# Patient Record
Sex: MALE | Race: OTHER | NOT HISPANIC OR LATINO
[De-identification: names, ages, dates, MRNs, and addresses within clinical notes are randomized per-mention and may not be internally consistent; named-entity substitution may affect disease eponyms.]

---

## 2017-01-31 ENCOUNTER — CHART COPY (OUTPATIENT)
Age: 67
End: 2017-01-31

## 2017-03-07 ENCOUNTER — CHART COPY (OUTPATIENT)
Age: 67
End: 2017-03-07

## 2017-04-18 ENCOUNTER — CHART COPY (OUTPATIENT)
Age: 67
End: 2017-04-18

## 2017-05-25 ENCOUNTER — CHART COPY (OUTPATIENT)
Age: 67
End: 2017-05-25

## 2017-06-05 ENCOUNTER — APPOINTMENT (OUTPATIENT)
Dept: ORTHOPEDIC SURGERY | Facility: CLINIC | Age: 67
End: 2017-06-05

## 2017-06-05 VITALS
HEART RATE: 75 BPM | WEIGHT: 215 LBS | BODY MASS INDEX: 35.82 KG/M2 | HEIGHT: 65 IN | SYSTOLIC BLOOD PRESSURE: 127 MMHG | DIASTOLIC BLOOD PRESSURE: 83 MMHG

## 2017-06-19 ENCOUNTER — OUTPATIENT (OUTPATIENT)
Dept: OUTPATIENT SERVICES | Facility: HOSPITAL | Age: 67
LOS: 1 days | Discharge: HOME | End: 2017-06-19

## 2017-06-19 DIAGNOSIS — Z96.651 PRESENCE OF RIGHT ARTIFICIAL KNEE JOINT: Chronic | ICD-10-CM

## 2017-06-19 DIAGNOSIS — Z53.8 PROCEDURE AND TREATMENT NOT CARRIED OUT FOR OTHER REASONS: Chronic | ICD-10-CM

## 2017-06-19 DIAGNOSIS — D47.2 MONOCLONAL GAMMOPATHY: ICD-10-CM

## 2017-06-22 ENCOUNTER — APPOINTMENT (OUTPATIENT)
Dept: HEMATOLOGY ONCOLOGY | Facility: CLINIC | Age: 67
End: 2017-06-22

## 2017-06-22 ENCOUNTER — RESULT REVIEW (OUTPATIENT)
Age: 67
End: 2017-06-22

## 2017-06-22 VITALS
BODY MASS INDEX: 37.02 KG/M2 | HEART RATE: 75 BPM | RESPIRATION RATE: 16 BRPM | OXYGEN SATURATION: 98 % | HEIGHT: 65 IN | TEMPERATURE: 98 F | SYSTOLIC BLOOD PRESSURE: 107 MMHG | WEIGHT: 222.23 LBS | DIASTOLIC BLOOD PRESSURE: 71 MMHG

## 2017-06-22 LAB
BASOPHILS # BLD AUTO: 0 K/UL — SIGNIFICANT CHANGE UP (ref 0–0.2)
BASOPHILS NFR BLD AUTO: 0.6 % — SIGNIFICANT CHANGE UP (ref 0–2)
EOSINOPHIL # BLD AUTO: 0.3 K/UL — SIGNIFICANT CHANGE UP (ref 0–0.5)
EOSINOPHIL NFR BLD AUTO: 5.5 % — SIGNIFICANT CHANGE UP (ref 0–6)
HCT VFR BLD CALC: 42 % — SIGNIFICANT CHANGE UP (ref 39–50)
HGB BLD-MCNC: 14.4 G/DL — SIGNIFICANT CHANGE UP (ref 13–17)
LYMPHOCYTES # BLD AUTO: 1.5 K/UL — SIGNIFICANT CHANGE UP (ref 1–3.3)
LYMPHOCYTES # BLD AUTO: 24 % — SIGNIFICANT CHANGE UP (ref 13–44)
MCHC RBC-ENTMCNC: 31.8 PG — SIGNIFICANT CHANGE UP (ref 27–34)
MCHC RBC-ENTMCNC: 34.4 G/DL — SIGNIFICANT CHANGE UP (ref 32–36)
MCV RBC AUTO: 92.5 FL — SIGNIFICANT CHANGE UP (ref 80–100)
MONOCYTES # BLD AUTO: 0.6 K/UL — SIGNIFICANT CHANGE UP (ref 0–0.9)
MONOCYTES NFR BLD AUTO: 9.1 % — SIGNIFICANT CHANGE UP (ref 2–14)
NEUTROPHILS # BLD AUTO: 3.8 K/UL — SIGNIFICANT CHANGE UP (ref 1.8–7.4)
NEUTROPHILS NFR BLD AUTO: 60.8 % — SIGNIFICANT CHANGE UP (ref 43–77)
PLATELET # BLD AUTO: 164 K/UL — SIGNIFICANT CHANGE UP (ref 150–400)
RBC # BLD: 4.54 M/UL — SIGNIFICANT CHANGE UP (ref 4.2–5.8)
RBC # FLD: 12 % — SIGNIFICANT CHANGE UP (ref 10.3–14.5)
WBC # BLD: 6.3 K/UL — SIGNIFICANT CHANGE UP (ref 3.8–10.5)
WBC # FLD AUTO: 6.3 K/UL — SIGNIFICANT CHANGE UP (ref 3.8–10.5)

## 2017-06-22 RX ORDER — LEVOCETIRIZINE DIHYDROCHLORIDE 5 MG/1
5 TABLET ORAL DAILY
Refills: 0 | Status: ACTIVE | COMMUNITY
Start: 2017-06-22

## 2017-06-28 LAB
ALBUMIN MFR SERPL ELPH: 60 %
ALBUMIN SERPL ELPH-MCNC: 4.4 G/DL
ALBUMIN SERPL-MCNC: 4.4 G/DL
ALBUMIN/GLOB SERPL: 1.5 RATIO
ALP BLD-CCNC: 73 U/L
ALPHA1 GLOB MFR SERPL ELPH: 4 %
ALPHA1 GLOB SERPL ELPH-MCNC: 0.3 G/DL
ALPHA2 GLOB MFR SERPL ELPH: 11.9 %
ALPHA2 GLOB SERPL ELPH-MCNC: 0.9 G/DL
ALT SERPL-CCNC: 21 U/L
ANION GAP SERPL CALC-SCNC: 13 MMOL/L
AST SERPL-CCNC: 19 U/L
B-GLOBULIN MFR SERPL ELPH: 9.8 %
B-GLOBULIN SERPL ELPH-MCNC: 0.7 G/DL
B2 MICROGLOB SERPL-MCNC: 1.6 MG/L
BILIRUB SERPL-MCNC: 0.6 MG/DL
BUN SERPL-MCNC: 21 MG/DL
CALCIUM SERPL-MCNC: 8.6 MG/DL
CHLORIDE SERPL-SCNC: 104 MMOL/L
CO2 SERPL-SCNC: 22 MMOL/L
CREAT SERPL-MCNC: 0.88 MG/DL
DEPRECATED KAPPA LC FREE/LAMBDA SER: 4.4 RATIO
GAMMA GLOB FLD ELPH-MCNC: 1 G/DL
GAMMA GLOB MFR SERPL ELPH: 14.3 %
GLUCOSE SERPL-MCNC: 119 MG/DL
IGA SER QL IEP: 64 MG/DL
IGG SER QL IEP: 1030 MG/DL
IGM SER QL IEP: 31 MG/DL
INTERPRETATION SERPL IEP-IMP: NORMAL
KAPPA LC CSF-MCNC: 1.48 MG/DL
KAPPA LC SERPL-MCNC: 6.51 MG/DL
LDH SERPL-CCNC: 208 U/L
M PROTEIN MFR SERPL ELPH: 8.3 %
M PROTEIN SPEC IFE-MCNC: NORMAL
MONOCLON BAND OBS SERPL: 0.6 G/DL
POTASSIUM SERPL-SCNC: 4.4 MMOL/L
PROT SERPL-MCNC: 7.3 G/DL
SODIUM SERPL-SCNC: 139 MMOL/L

## 2017-07-03 ENCOUNTER — CHART COPY (OUTPATIENT)
Age: 67
End: 2017-07-03

## 2017-07-07 ENCOUNTER — CHART COPY (OUTPATIENT)
Age: 67
End: 2017-07-07

## 2017-09-19 ENCOUNTER — CHART COPY (OUTPATIENT)
Age: 67
End: 2017-09-19

## 2017-09-20 ENCOUNTER — CHART COPY (OUTPATIENT)
Age: 67
End: 2017-09-20

## 2017-11-07 ENCOUNTER — CHART COPY (OUTPATIENT)
Age: 67
End: 2017-11-07

## 2017-12-04 ENCOUNTER — APPOINTMENT (OUTPATIENT)
Dept: ORTHOPEDIC SURGERY | Facility: CLINIC | Age: 67
End: 2017-12-04
Payer: MEDICARE

## 2017-12-04 VITALS
HEIGHT: 65 IN | SYSTOLIC BLOOD PRESSURE: 123 MMHG | BODY MASS INDEX: 36.99 KG/M2 | HEART RATE: 80 BPM | DIASTOLIC BLOOD PRESSURE: 75 MMHG | WEIGHT: 222 LBS

## 2017-12-04 PROCEDURE — 99213 OFFICE O/P EST LOW 20 MIN: CPT | Mod: 25

## 2017-12-04 PROCEDURE — 96372 THER/PROPH/DIAG INJ SC/IM: CPT

## 2017-12-13 ENCOUNTER — CHART COPY (OUTPATIENT)
Age: 67
End: 2017-12-13

## 2017-12-13 ENCOUNTER — APPOINTMENT (OUTPATIENT)
Dept: ORTHOPEDIC SURGERY | Facility: CLINIC | Age: 67
End: 2017-12-13
Payer: MEDICARE

## 2017-12-13 VITALS
HEIGHT: 65 IN | HEART RATE: 82 BPM | DIASTOLIC BLOOD PRESSURE: 79 MMHG | SYSTOLIC BLOOD PRESSURE: 126 MMHG | BODY MASS INDEX: 36.99 KG/M2 | WEIGHT: 222 LBS

## 2017-12-13 PROCEDURE — 99213 OFFICE O/P EST LOW 20 MIN: CPT

## 2017-12-15 ENCOUNTER — OUTPATIENT (OUTPATIENT)
Dept: OUTPATIENT SERVICES | Facility: HOSPITAL | Age: 67
LOS: 1 days | Discharge: HOME | End: 2017-12-15

## 2017-12-15 DIAGNOSIS — Z96.651 PRESENCE OF RIGHT ARTIFICIAL KNEE JOINT: Chronic | ICD-10-CM

## 2017-12-15 DIAGNOSIS — D47.2 MONOCLONAL GAMMOPATHY: ICD-10-CM

## 2017-12-15 DIAGNOSIS — Z53.8 PROCEDURE AND TREATMENT NOT CARRIED OUT FOR OTHER REASONS: Chronic | ICD-10-CM

## 2017-12-21 ENCOUNTER — RESULT REVIEW (OUTPATIENT)
Age: 67
End: 2017-12-21

## 2017-12-21 ENCOUNTER — APPOINTMENT (OUTPATIENT)
Dept: HEMATOLOGY ONCOLOGY | Facility: CLINIC | Age: 67
End: 2017-12-21
Payer: MEDICARE

## 2017-12-21 VITALS
TEMPERATURE: 97.9 F | SYSTOLIC BLOOD PRESSURE: 151 MMHG | OXYGEN SATURATION: 96 % | RESPIRATION RATE: 16 BRPM | HEART RATE: 69 BPM | WEIGHT: 221.34 LBS | DIASTOLIC BLOOD PRESSURE: 77 MMHG | BODY MASS INDEX: 36.83 KG/M2

## 2017-12-21 VITALS — SYSTOLIC BLOOD PRESSURE: 116 MMHG | DIASTOLIC BLOOD PRESSURE: 77 MMHG

## 2017-12-21 LAB
BASOPHILS # BLD AUTO: 0.1 K/UL — SIGNIFICANT CHANGE UP (ref 0–0.2)
BASOPHILS NFR BLD AUTO: 1 % — SIGNIFICANT CHANGE UP (ref 0–2)
EOSINOPHIL # BLD AUTO: 0.1 K/UL — SIGNIFICANT CHANGE UP (ref 0–0.5)
EOSINOPHIL NFR BLD AUTO: 2.5 % — SIGNIFICANT CHANGE UP (ref 0–6)
HCT VFR BLD CALC: 42 % — SIGNIFICANT CHANGE UP (ref 39–50)
HGB BLD-MCNC: 14.4 G/DL — SIGNIFICANT CHANGE UP (ref 13–17)
LYMPHOCYTES # BLD AUTO: 1.4 K/UL — SIGNIFICANT CHANGE UP (ref 1–3.3)
LYMPHOCYTES # BLD AUTO: 26.6 % — SIGNIFICANT CHANGE UP (ref 13–44)
MCHC RBC-ENTMCNC: 32 PG — SIGNIFICANT CHANGE UP (ref 27–34)
MCHC RBC-ENTMCNC: 34.3 G/DL — SIGNIFICANT CHANGE UP (ref 32–36)
MCV RBC AUTO: 93.1 FL — SIGNIFICANT CHANGE UP (ref 80–100)
MONOCYTES # BLD AUTO: 0.6 K/UL — SIGNIFICANT CHANGE UP (ref 0–0.9)
MONOCYTES NFR BLD AUTO: 10.8 % — SIGNIFICANT CHANGE UP (ref 2–14)
NEUTROPHILS # BLD AUTO: 3 K/UL — SIGNIFICANT CHANGE UP (ref 1.8–7.4)
NEUTROPHILS NFR BLD AUTO: 59.1 % — SIGNIFICANT CHANGE UP (ref 43–77)
PLATELET # BLD AUTO: 198 K/UL — SIGNIFICANT CHANGE UP (ref 150–400)
RBC # BLD: 4.51 M/UL — SIGNIFICANT CHANGE UP (ref 4.2–5.8)
RBC # FLD: 12.3 % — SIGNIFICANT CHANGE UP (ref 10.3–14.5)
WBC # BLD: 5.2 K/UL — SIGNIFICANT CHANGE UP (ref 3.8–10.5)
WBC # FLD AUTO: 5.2 K/UL — SIGNIFICANT CHANGE UP (ref 3.8–10.5)

## 2017-12-21 PROCEDURE — 99213 OFFICE O/P EST LOW 20 MIN: CPT

## 2018-01-17 ENCOUNTER — APPOINTMENT (OUTPATIENT)
Dept: ORTHOPEDIC SURGERY | Facility: CLINIC | Age: 68
End: 2018-01-17
Payer: MEDICARE

## 2018-01-17 VITALS
WEIGHT: 221 LBS | SYSTOLIC BLOOD PRESSURE: 125 MMHG | DIASTOLIC BLOOD PRESSURE: 85 MMHG | HEIGHT: 65 IN | HEART RATE: 66 BPM | BODY MASS INDEX: 36.82 KG/M2

## 2018-01-17 DIAGNOSIS — M17.11 UNILATERAL PRIMARY OSTEOARTHRITIS, RIGHT KNEE: ICD-10-CM

## 2018-01-17 DIAGNOSIS — M75.41 IMPINGEMENT SYNDROME OF RIGHT SHOULDER: ICD-10-CM

## 2018-01-17 PROCEDURE — 99213 OFFICE O/P EST LOW 20 MIN: CPT

## 2018-02-14 ENCOUNTER — APPOINTMENT (OUTPATIENT)
Dept: ORTHOPEDIC SURGERY | Facility: CLINIC | Age: 68
End: 2018-02-14
Payer: MEDICARE

## 2018-02-14 VITALS
HEIGHT: 65 IN | BODY MASS INDEX: 36.82 KG/M2 | WEIGHT: 221 LBS | DIASTOLIC BLOOD PRESSURE: 66 MMHG | HEART RATE: 85 BPM | SYSTOLIC BLOOD PRESSURE: 96 MMHG

## 2018-02-14 PROCEDURE — 99213 OFFICE O/P EST LOW 20 MIN: CPT

## 2018-03-19 ENCOUNTER — APPOINTMENT (OUTPATIENT)
Dept: ORTHOPEDIC SURGERY | Facility: CLINIC | Age: 68
End: 2018-03-19
Payer: MEDICARE

## 2018-03-19 VITALS
HEIGHT: 65 IN | BODY MASS INDEX: 36.82 KG/M2 | DIASTOLIC BLOOD PRESSURE: 66 MMHG | WEIGHT: 221 LBS | SYSTOLIC BLOOD PRESSURE: 101 MMHG | HEART RATE: 89 BPM

## 2018-03-19 PROCEDURE — 99213 OFFICE O/P EST LOW 20 MIN: CPT

## 2018-03-23 ENCOUNTER — CHART COPY (OUTPATIENT)
Age: 68
End: 2018-03-23

## 2018-05-07 ENCOUNTER — APPOINTMENT (OUTPATIENT)
Dept: ORTHOPEDIC SURGERY | Facility: CLINIC | Age: 68
End: 2018-05-07
Payer: MEDICARE

## 2018-05-07 VITALS
WEIGHT: 220 LBS | HEART RATE: 50 BPM | HEIGHT: 65 IN | DIASTOLIC BLOOD PRESSURE: 57 MMHG | SYSTOLIC BLOOD PRESSURE: 112 MMHG | BODY MASS INDEX: 36.65 KG/M2

## 2018-05-07 PROCEDURE — 99213 OFFICE O/P EST LOW 20 MIN: CPT

## 2018-06-26 ENCOUNTER — OUTPATIENT (OUTPATIENT)
Dept: OUTPATIENT SERVICES | Facility: HOSPITAL | Age: 68
LOS: 1 days | Discharge: HOME | End: 2018-06-26

## 2018-06-26 DIAGNOSIS — Z96.651 PRESENCE OF RIGHT ARTIFICIAL KNEE JOINT: Chronic | ICD-10-CM

## 2018-06-26 DIAGNOSIS — D47.2 MONOCLONAL GAMMOPATHY: ICD-10-CM

## 2018-06-26 DIAGNOSIS — Z53.8 PROCEDURE AND TREATMENT NOT CARRIED OUT FOR OTHER REASONS: Chronic | ICD-10-CM

## 2018-06-28 ENCOUNTER — APPOINTMENT (OUTPATIENT)
Dept: HEMATOLOGY ONCOLOGY | Facility: CLINIC | Age: 68
End: 2018-06-28
Payer: MEDICARE

## 2018-06-28 ENCOUNTER — RESULT REVIEW (OUTPATIENT)
Age: 68
End: 2018-06-28

## 2018-06-28 VITALS
DIASTOLIC BLOOD PRESSURE: 70 MMHG | WEIGHT: 218.24 LBS | OXYGEN SATURATION: 97 % | TEMPERATURE: 97.9 F | BODY MASS INDEX: 36.32 KG/M2 | SYSTOLIC BLOOD PRESSURE: 120 MMHG | RESPIRATION RATE: 17 BRPM | HEART RATE: 56 BPM

## 2018-06-28 LAB
ALBUMIN MFR SERPL ELPH: 62.3 %
ALBUMIN SERPL ELPH-MCNC: 4.3 G/DL
ALBUMIN SERPL-MCNC: 4.2 G/DL
ALBUMIN/GLOB SERPL: 1.6 RATIO
ALP BLD-CCNC: 71 U/L
ALPHA1 GLOB MFR SERPL ELPH: 4.1 %
ALPHA1 GLOB SERPL ELPH-MCNC: 0.3 G/DL
ALPHA2 GLOB MFR SERPL ELPH: 11.3 %
ALPHA2 GLOB SERPL ELPH-MCNC: 0.8 G/DL
ALT SERPL-CCNC: 25 U/L
ANION GAP SERPL CALC-SCNC: 12 MMOL/L
AST SERPL-CCNC: 21 U/L
B-GLOBULIN MFR SERPL ELPH: 9.2 %
B-GLOBULIN SERPL ELPH-MCNC: 0.6 G/DL
B2 MICROGLOB SERPL-MCNC: 1.7 MG/L
BASOPHILS # BLD AUTO: 0 K/UL — SIGNIFICANT CHANGE UP (ref 0–0.2)
BASOPHILS NFR BLD AUTO: 0.6 % — SIGNIFICANT CHANGE UP (ref 0–2)
BILIRUB SERPL-MCNC: 0.7 MG/DL
BUN SERPL-MCNC: 18 MG/DL
CALCIUM SERPL-MCNC: 9.2 MG/DL
CHLORIDE SERPL-SCNC: 102 MMOL/L
CO2 SERPL-SCNC: 27 MMOL/L
CREAT SERPL-MCNC: 1.03 MG/DL
DEPRECATED KAPPA LC FREE/LAMBDA SER: 5.63 RATIO
EOSINOPHIL # BLD AUTO: 0.3 K/UL — SIGNIFICANT CHANGE UP (ref 0–0.5)
EOSINOPHIL NFR BLD AUTO: 4.8 % — SIGNIFICANT CHANGE UP (ref 0–6)
GAMMA GLOB FLD ELPH-MCNC: 0.9 G/DL
GAMMA GLOB MFR SERPL ELPH: 13.1 %
GLUCOSE SERPL-MCNC: 87 MG/DL
HCT VFR BLD CALC: 44.3 % — SIGNIFICANT CHANGE UP (ref 39–50)
HGB BLD-MCNC: 15 G/DL — SIGNIFICANT CHANGE UP (ref 13–17)
IGA SER QL IEP: 79 MG/DL
IGG SER QL IEP: 1040 MG/DL
IGM SER QL IEP: 38 MG/DL
INTERPRETATION SERPL IEP-IMP: NORMAL
KAPPA LC CSF-MCNC: 0.86 MG/DL
KAPPA LC SERPL-MCNC: 4.84 MG/DL
LDH SERPL-CCNC: 196 U/L
LYMPHOCYTES # BLD AUTO: 1.6 K/UL — SIGNIFICANT CHANGE UP (ref 1–3.3)
LYMPHOCYTES # BLD AUTO: 25.3 % — SIGNIFICANT CHANGE UP (ref 13–44)
M PROTEIN MFR SERPL ELPH: 7.3 %
M PROTEIN SPEC IFE-MCNC: NORMAL
MCHC RBC-ENTMCNC: 30.7 PG — SIGNIFICANT CHANGE UP (ref 27–34)
MCHC RBC-ENTMCNC: 33.7 G/DL — SIGNIFICANT CHANGE UP (ref 32–36)
MCV RBC AUTO: 90.9 FL — SIGNIFICANT CHANGE UP (ref 80–100)
MONOCLON BAND OBS SERPL: 0.5 G/DL
MONOCYTES # BLD AUTO: 0.6 K/UL — SIGNIFICANT CHANGE UP (ref 0–0.9)
MONOCYTES NFR BLD AUTO: 10.1 % — SIGNIFICANT CHANGE UP (ref 2–14)
NEUTROPHILS # BLD AUTO: 3.7 K/UL — SIGNIFICANT CHANGE UP (ref 1.8–7.4)
NEUTROPHILS NFR BLD AUTO: 59.3 % — SIGNIFICANT CHANGE UP (ref 43–77)
PLATELET # BLD AUTO: 147 K/UL — LOW (ref 150–400)
POTASSIUM SERPL-SCNC: 4.2 MMOL/L
PROT SERPL-MCNC: 6.8 G/DL
RBC # BLD: 4.88 M/UL — SIGNIFICANT CHANGE UP (ref 4.2–5.8)
RBC # FLD: 12.6 % — SIGNIFICANT CHANGE UP (ref 10.3–14.5)
SODIUM SERPL-SCNC: 141 MMOL/L
WBC # BLD: 6.2 K/UL — SIGNIFICANT CHANGE UP (ref 3.8–10.5)
WBC # FLD AUTO: 6.2 K/UL — SIGNIFICANT CHANGE UP (ref 3.8–10.5)

## 2018-06-28 PROCEDURE — 99214 OFFICE O/P EST MOD 30 MIN: CPT

## 2018-07-13 ENCOUNTER — RX RENEWAL (OUTPATIENT)
Age: 68
End: 2018-07-13

## 2018-07-16 ENCOUNTER — APPOINTMENT (OUTPATIENT)
Dept: ORTHOPEDIC SURGERY | Facility: CLINIC | Age: 68
End: 2018-07-16
Payer: MEDICARE

## 2018-07-16 VITALS — WEIGHT: 218 LBS | HEIGHT: 65 IN | BODY MASS INDEX: 36.32 KG/M2

## 2018-07-16 DIAGNOSIS — Z98.890 OTHER SPECIFIED POSTPROCEDURAL STATES: ICD-10-CM

## 2018-07-16 DIAGNOSIS — Z86.79 PERSONAL HISTORY OF OTHER DISEASES OF THE CIRCULATORY SYSTEM: ICD-10-CM

## 2018-07-16 DIAGNOSIS — Z86.39 PERSONAL HISTORY OF OTHER ENDOCRINE, NUTRITIONAL AND METABOLIC DISEASE: ICD-10-CM

## 2018-07-16 PROCEDURE — 20550 NJX 1 TENDON SHEATH/LIGAMENT: CPT | Mod: LT

## 2018-07-16 PROCEDURE — 99214 OFFICE O/P EST MOD 30 MIN: CPT | Mod: 25

## 2018-08-15 ENCOUNTER — CHART COPY (OUTPATIENT)
Age: 68
End: 2018-08-15

## 2018-10-23 ENCOUNTER — CHART COPY (OUTPATIENT)
Age: 68
End: 2018-10-23

## 2018-11-07 ENCOUNTER — APPOINTMENT (OUTPATIENT)
Dept: ORTHOPEDIC SURGERY | Facility: CLINIC | Age: 68
End: 2018-11-07
Payer: MEDICARE

## 2018-11-07 VITALS
HEIGHT: 65 IN | SYSTOLIC BLOOD PRESSURE: 126 MMHG | DIASTOLIC BLOOD PRESSURE: 60 MMHG | HEART RATE: 48 BPM | BODY MASS INDEX: 36.32 KG/M2 | WEIGHT: 218 LBS

## 2018-11-07 PROCEDURE — 99214 OFFICE O/P EST MOD 30 MIN: CPT

## 2018-11-13 ENCOUNTER — APPOINTMENT (OUTPATIENT)
Dept: ORTHOPEDIC SURGERY | Facility: CLINIC | Age: 68
End: 2018-11-13
Payer: MEDICARE

## 2018-11-13 VITALS
HEART RATE: 78 BPM | WEIGHT: 218 LBS | SYSTOLIC BLOOD PRESSURE: 127 MMHG | HEIGHT: 65 IN | DIASTOLIC BLOOD PRESSURE: 64 MMHG | BODY MASS INDEX: 36.32 KG/M2

## 2018-11-13 DIAGNOSIS — M65.342 TRIGGER FINGER, LEFT RING FINGER: ICD-10-CM

## 2018-11-13 PROCEDURE — 99214 OFFICE O/P EST MOD 30 MIN: CPT | Mod: 25

## 2018-11-13 PROCEDURE — 20551 NJX 1 TENDON ORIGIN/INSJ: CPT | Mod: LT

## 2018-12-07 ENCOUNTER — OUTPATIENT (OUTPATIENT)
Dept: OUTPATIENT SERVICES | Facility: HOSPITAL | Age: 68
LOS: 1 days | Discharge: HOME | End: 2018-12-07

## 2018-12-07 DIAGNOSIS — Z53.8 PROCEDURE AND TREATMENT NOT CARRIED OUT FOR OTHER REASONS: Chronic | ICD-10-CM

## 2018-12-07 DIAGNOSIS — Z96.651 PRESENCE OF RIGHT ARTIFICIAL KNEE JOINT: Chronic | ICD-10-CM

## 2018-12-07 DIAGNOSIS — D47.2 MONOCLONAL GAMMOPATHY: ICD-10-CM

## 2018-12-13 ENCOUNTER — RESULT REVIEW (OUTPATIENT)
Age: 68
End: 2018-12-13

## 2018-12-13 ENCOUNTER — LABORATORY RESULT (OUTPATIENT)
Age: 68
End: 2018-12-13

## 2018-12-13 ENCOUNTER — APPOINTMENT (OUTPATIENT)
Dept: HEMATOLOGY ONCOLOGY | Facility: CLINIC | Age: 68
End: 2018-12-13
Payer: MEDICARE

## 2018-12-13 VITALS
HEART RATE: 73 BPM | DIASTOLIC BLOOD PRESSURE: 75 MMHG | BODY MASS INDEX: 36.43 KG/M2 | WEIGHT: 218.92 LBS | TEMPERATURE: 97.9 F | SYSTOLIC BLOOD PRESSURE: 137 MMHG | OXYGEN SATURATION: 97 % | RESPIRATION RATE: 16 BRPM

## 2018-12-13 LAB
BASOPHILS # BLD AUTO: 0.1 K/UL — SIGNIFICANT CHANGE UP (ref 0–0.2)
BASOPHILS NFR BLD AUTO: 1.4 % — SIGNIFICANT CHANGE UP (ref 0–2)
EOSINOPHIL # BLD AUTO: 0.2 K/UL — SIGNIFICANT CHANGE UP (ref 0–0.5)
EOSINOPHIL NFR BLD AUTO: 2.8 % — SIGNIFICANT CHANGE UP (ref 0–6)
HCT VFR BLD CALC: 43.1 % — SIGNIFICANT CHANGE UP (ref 39–50)
HGB BLD-MCNC: 14.3 G/DL — SIGNIFICANT CHANGE UP (ref 13–17)
LYMPHOCYTES # BLD AUTO: 1.5 K/UL — SIGNIFICANT CHANGE UP (ref 1–3.3)
LYMPHOCYTES # BLD AUTO: 26.6 % — SIGNIFICANT CHANGE UP (ref 13–44)
MCHC RBC-ENTMCNC: 30.6 PG — SIGNIFICANT CHANGE UP (ref 27–34)
MCHC RBC-ENTMCNC: 33.3 G/DL — SIGNIFICANT CHANGE UP (ref 32–36)
MCV RBC AUTO: 92.1 FL — SIGNIFICANT CHANGE UP (ref 80–100)
MONOCYTES # BLD AUTO: 0.7 K/UL — SIGNIFICANT CHANGE UP (ref 0–0.9)
MONOCYTES NFR BLD AUTO: 12.9 % — SIGNIFICANT CHANGE UP (ref 2–14)
NEUTROPHILS # BLD AUTO: 3.2 K/UL — SIGNIFICANT CHANGE UP (ref 1.8–7.4)
NEUTROPHILS NFR BLD AUTO: 56.3 % — SIGNIFICANT CHANGE UP (ref 43–77)
PLATELET # BLD AUTO: 147 K/UL — LOW (ref 150–400)
RBC # BLD: 4.67 M/UL — SIGNIFICANT CHANGE UP (ref 4.2–5.8)
RBC # FLD: 12.1 % — SIGNIFICANT CHANGE UP (ref 10.3–14.5)
WBC # BLD: 5.7 K/UL — SIGNIFICANT CHANGE UP (ref 3.8–10.5)
WBC # FLD AUTO: 5.7 K/UL — SIGNIFICANT CHANGE UP (ref 3.8–10.5)

## 2018-12-13 PROCEDURE — 99213 OFFICE O/P EST LOW 20 MIN: CPT

## 2018-12-14 NOTE — ASSESSMENT
[Palliative] : Goals of care discussed with patient: Palliative [Palliative Care Plan] : not applicable at this time [FreeTextEntry1] : MGUS - stable; he is at higher risk on account of  abnormal quantitative free serum light chain ratio.  His disease has remained stable since he was first seen here over 5  years ago.  No c/o suggestive of myeloma or NHL.\par repeat CMP, LDH, quant free serum light chains and ratio, SPEP, quant. immunoglobulins next  visit\par CBC wnl today except minimal decr in plt (147K)- no change in 6 months\par \par RV 6 months\par \par \par \par \par

## 2018-12-14 NOTE — PHYSICAL EXAM
[Fully active, able to carry on all pre-disease performance without restriction] : Status 0 - Fully active, able to carry on all pre-disease performance without restriction [Normal] : affect appropriate [de-identified] : no JVD, or calf tenderness, (+) venous stasis changes, no edema

## 2018-12-14 NOTE — HISTORY OF PRESENT ILLNESS
[Disease:__________________________] : Disease: [unfilled] [de-identified] : Adalberto Scanlon, was first seen at the [facility] [place] on September 4, 2012.  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  He has some degree of unsteadiness in his gait but no falls.  Of note, he has a history of type II diabetes.\par \par He underwent a neurologic evaluation by Dr. Beatrice Ta.  An MRI of the LS spine from October 2011 showed moderate left parasagittal and femoral disc herniations at L3-L4 which were new compared to a prior study in 2005.  Compression of the left L3 nerve root was seen as well as mild compression of the left lateral aspect of the thecal sac.  Additional smaller areas of herniation were seen at L1-L2 and L4-L5. 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  The MRI was repeated on June 27, 2012 and this showed no significant changes. \par \par Immunoprotein studies were performed to further complete the neuropathy evaluation and a small M spike of 0.5 g/dl was seen along with an IgG monoclonal kappa light chain protein.  The vitamin B12 level was normal.  A Lyme titer was negative. \par \par The patient was then seen by Dr. Lauri Gilbert and a bone marrow showed trilineage maturation along with an IgG kappa plasmacytosis averaging less than 10% of the overall marrow cellularity.  The plasma cells expressed , CD20 and BCL1.  Rare paratrabecular lymphoid aggregates were seen made up of small lymphoid cells which consisted of a mixture of B and T cells and were negative for CD5 and CD10.  \par \par A skeletal survey showed no lytic or blastic lesions.\par \par 24 hour urine showed no light chain excretion.\par \par The chemistries demonstrated a normal creatinine and calcium levels as well as liver function.  A beta-2 microglobulin was normal.  The free kappa serum light chain ratio was abnormal (5.08, 0.26-1.65) with a mild elevation of the free kappa serum light chain to 36.1 mg/L.\par \par He has since had no evidence of progression of disease and has not needed treatment to date.\par \par  [de-identified] : mgus, kappa light chain [FreeTextEntry1] : no therapy to date [de-identified] : MGUS - M spike stable, kappa light chain and quant free light chain ratio in 4 range.  Stable with about 5 yrs observation - minimal increase in kappa free light chains. CBC, creat were wnl as well as Ca in June, 2017.  labs reviewed and M spike 0.5 last visit versus 0.6 in December of 2017.   Beta 2 MG, CBC wnl.\par No bone pain, fevers, respiratory infections\par Peripheral neuropathy -now resolved\par Low Back Pain -intermittent, effort related,  pain sl better, less right shoulder pain- overall, no change x 6 mos\par Cervical disc pain - more pronounced now, sees chiropractor which has helped, overall no new skeletal c/o.  Neck pain improved post steroid/lidocaine local injection by Dr. Amaro\par   \par Type 2 diabetes - Hgb A1C  last was 6.6\par

## 2018-12-14 NOTE — REVIEW OF SYSTEMS
[Joint Pain] : joint pain [Negative] : Allergic/Immunologic [Joint Stiffness] : no joint stiffness [Muscle Pain] : no muscle pain [Muscle Weakness] : no muscle weakness [Skin Rash] : no skin rash [Confused] : no confusion [Dizziness] : no dizziness [Fainting] : no fainting [Difficulty Walking] : no difficulty walking [FreeTextEntry9] :  as above [de-identified] : peripheral neuropathy resolved

## 2019-01-30 ENCOUNTER — CHART COPY (OUTPATIENT)
Age: 69
End: 2019-01-30

## 2019-02-25 ENCOUNTER — CHART COPY (OUTPATIENT)
Age: 69
End: 2019-02-25

## 2019-03-26 ENCOUNTER — CHART COPY (OUTPATIENT)
Age: 69
End: 2019-03-26

## 2019-04-26 ENCOUNTER — CHART COPY (OUTPATIENT)
Age: 69
End: 2019-04-26

## 2019-05-15 ENCOUNTER — APPOINTMENT (OUTPATIENT)
Dept: ORTHOPEDIC SURGERY | Facility: CLINIC | Age: 69
End: 2019-05-15
Payer: MEDICARE

## 2019-05-15 VITALS — HEART RATE: 76 BPM | DIASTOLIC BLOOD PRESSURE: 76 MMHG | SYSTOLIC BLOOD PRESSURE: 117 MMHG

## 2019-05-15 PROCEDURE — 99213 OFFICE O/P EST LOW 20 MIN: CPT

## 2019-06-20 ENCOUNTER — OUTPATIENT (OUTPATIENT)
Dept: OUTPATIENT SERVICES | Facility: HOSPITAL | Age: 69
LOS: 1 days | Discharge: HOME | End: 2019-06-20

## 2019-06-20 DIAGNOSIS — Z53.8 PROCEDURE AND TREATMENT NOT CARRIED OUT FOR OTHER REASONS: Chronic | ICD-10-CM

## 2019-06-20 DIAGNOSIS — Z96.651 PRESENCE OF RIGHT ARTIFICIAL KNEE JOINT: Chronic | ICD-10-CM

## 2019-06-20 DIAGNOSIS — D47.2 MONOCLONAL GAMMOPATHY: ICD-10-CM

## 2019-06-25 ENCOUNTER — RESULT REVIEW (OUTPATIENT)
Age: 69
End: 2019-06-25

## 2019-06-25 ENCOUNTER — APPOINTMENT (OUTPATIENT)
Dept: HEMATOLOGY ONCOLOGY | Facility: CLINIC | Age: 69
End: 2019-06-25
Payer: MEDICARE

## 2019-06-25 VITALS
DIASTOLIC BLOOD PRESSURE: 62 MMHG | HEART RATE: 48 BPM | WEIGHT: 220.46 LBS | OXYGEN SATURATION: 97 % | RESPIRATION RATE: 15 BRPM | BODY MASS INDEX: 36.69 KG/M2 | TEMPERATURE: 98 F | SYSTOLIC BLOOD PRESSURE: 121 MMHG

## 2019-06-25 VITALS — HEART RATE: 52 BPM

## 2019-06-25 DIAGNOSIS — R26.9 UNSPECIFIED ABNORMALITIES OF GAIT AND MOBILITY: ICD-10-CM

## 2019-06-25 LAB
BASOPHILS # BLD AUTO: 0 K/UL — SIGNIFICANT CHANGE UP (ref 0–0.2)
BASOPHILS NFR BLD AUTO: 0.5 % — SIGNIFICANT CHANGE UP (ref 0–2)
EOSINOPHIL # BLD AUTO: 0.2 K/UL — SIGNIFICANT CHANGE UP (ref 0–0.5)
EOSINOPHIL NFR BLD AUTO: 3.2 % — SIGNIFICANT CHANGE UP (ref 0–6)
HCT VFR BLD CALC: 44.4 % — SIGNIFICANT CHANGE UP (ref 39–50)
HGB BLD-MCNC: 14.8 G/DL — SIGNIFICANT CHANGE UP (ref 13–17)
LYMPHOCYTES # BLD AUTO: 1.6 K/UL — SIGNIFICANT CHANGE UP (ref 1–3.3)
LYMPHOCYTES # BLD AUTO: 22.3 % — SIGNIFICANT CHANGE UP (ref 13–44)
MCHC RBC-ENTMCNC: 31.4 PG — SIGNIFICANT CHANGE UP (ref 27–34)
MCHC RBC-ENTMCNC: 33.4 G/DL — SIGNIFICANT CHANGE UP (ref 32–36)
MCV RBC AUTO: 93.9 FL — SIGNIFICANT CHANGE UP (ref 80–100)
MONOCYTES # BLD AUTO: 0.7 K/UL — SIGNIFICANT CHANGE UP (ref 0–0.9)
MONOCYTES NFR BLD AUTO: 10.4 % — SIGNIFICANT CHANGE UP (ref 2–14)
NEUTROPHILS # BLD AUTO: 4.6 K/UL — SIGNIFICANT CHANGE UP (ref 1.8–7.4)
NEUTROPHILS NFR BLD AUTO: 63.5 % — SIGNIFICANT CHANGE UP (ref 43–77)
PLATELET # BLD AUTO: 137 K/UL — LOW (ref 150–400)
RBC # BLD: 4.73 M/UL — SIGNIFICANT CHANGE UP (ref 4.2–5.8)
RBC # FLD: 13.2 % — SIGNIFICANT CHANGE UP (ref 10.3–14.5)
WBC # BLD: 7.2 K/UL — SIGNIFICANT CHANGE UP (ref 3.8–10.5)
WBC # FLD AUTO: 7.2 K/UL — SIGNIFICANT CHANGE UP (ref 3.8–10.5)

## 2019-06-25 PROCEDURE — 99213 OFFICE O/P EST LOW 20 MIN: CPT

## 2019-06-25 NOTE — ASSESSMENT
[Palliative] : Goals of care discussed with patient: Palliative [Palliative Care Plan] : not applicable at this time [FreeTextEntry1] : MGUS - stable for several years ; he remains at higher risk on account of  abnormal quantitative free serum light chain ratio.  \par His disease has remained stable since he was first seen here over 5  years ago.  No c/o suggestive of myeloma or NHL.\par repeat CMP, LDH, quant free serum light chains and ratio, SPEP, quant. immunoglobulins toay\par CBC wnl today except minimal decr in plt (137K)-  overall stable\par RV 6 months\par \par \par \par \par

## 2019-06-25 NOTE — ADDENDUM
[FreeTextEntry1] : Documented by Hayley Ortiz acting as a scribe for Dr. Germán Allen on 06/25/2019.\par \par All medical record entries made by a scribe were at my, Dr. Germán Allen direction and personally dictated by me on 06/25/2019. I have reviewed the chart and agree that the record accurately reflects my personal performance of the history, physical exam, procedure and imaging.\par

## 2019-06-25 NOTE — HISTORY OF PRESENT ILLNESS
[Disease:__________________________] : Disease: [unfilled] [de-identified] : Adalberto Scanlon, was first seen at the [facility] [place] on September 4, 2012.  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  He has some degree of unsteadiness in his gait but no falls.  Of note, he has a history of type II diabetes.\par \par He underwent a neurologic evaluation by Dr. Beatrice Ta.  An MRI of the LS spine from October 2011 showed moderate left parasagittal and femoral disc herniations at L3-L4 which were new compared to a prior study in 2005.  Compression of the left L3 nerve root was seen as well as mild compression of the left lateral aspect of the thecal sac.  Additional smaller areas of herniation were seen at L1-L2 and L4-L5. 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  The MRI was repeated on June 27, 2012 and this showed no significant changes. \par \par Immunoprotein studies were performed to further complete the neuropathy evaluation and a small M spike of 0.5 g/dl was seen along with an IgG monoclonal kappa light chain protein.  The vitamin B12 level was normal.  A Lyme titer was negative. \par \par The patient was then seen by Dr. Lauri Gilbert and a bone marrow showed trilineage maturation along with an IgG kappa plasmacytosis averaging less than 10% of the overall marrow cellularity.  The plasma cells expressed , CD20 and BCL1.  Rare paratrabecular lymphoid aggregates were seen made up of small lymphoid cells which consisted of a mixture of B and T cells and were negative for CD5 and CD10.  \par \par A skeletal survey showed no lytic or blastic lesions.\par \par 24 hour urine showed no light chain excretion.\par \par The chemistries demonstrated a normal creatinine and calcium levels as well as liver function.  A beta-2 microglobulin was normal.  The free kappa serum light chain ratio was abnormal (5.08, 0.26-1.65) with a mild elevation of the free kappa serum light chain to 36.1 mg/L.\par \par He has since had no evidence of progression of disease and has not needed treatment to date.\par \par  [de-identified] : MGUS - M spike stable, kappa light chain and quant free light chain ratio in 4 range.  Stable with about 5 yrs observation - minimal increase in kappa free light chains. Creat wnl as well as Ca in June, 2017. \par Remains afebrile, he has had no respiratory infections\par Low Back Pain -He continues to have low back pain, radiating to the left leg. This has been a chronic problem. He continues to see chiropractor Dr. Amaro which has helped. He is also implementing physical therapy for tx. 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 Gait is better\par CBC is wnl except for a plt count of 137K, similar to pervious values. Last M-spike was 0.5 on 12/13/18 which is almost identical to the result from 12/22/16. Renal function remains normal. [de-identified] : mgus, kappa light chain [FreeTextEntry1] : no therapy to date

## 2019-06-25 NOTE — PHYSICAL EXAM
[Fully active, able to carry on all pre-disease performance without restriction] : Status 0 - Fully active, able to carry on all pre-disease performance without restriction [Normal] : affect appropriate [de-identified] : RRR with PC [de-identified] : no JVD, or calf tenderness, (+) venous stasis changes, no edema

## 2019-07-01 ENCOUNTER — CHART COPY (OUTPATIENT)
Age: 69
End: 2019-07-01

## 2019-07-08 ENCOUNTER — NON-APPOINTMENT (OUTPATIENT)
Age: 69
End: 2019-07-08

## 2019-07-08 ENCOUNTER — APPOINTMENT (OUTPATIENT)
Dept: ELECTROPHYSIOLOGY | Facility: CLINIC | Age: 69
End: 2019-07-08
Payer: MEDICARE

## 2019-07-08 VITALS
WEIGHT: 220.46 LBS | OXYGEN SATURATION: 98 % | DIASTOLIC BLOOD PRESSURE: 75 MMHG | HEART RATE: 81 BPM | SYSTOLIC BLOOD PRESSURE: 145 MMHG | HEIGHT: 65 IN | BODY MASS INDEX: 36.73 KG/M2

## 2019-07-08 PROCEDURE — 99205 OFFICE O/P NEW HI 60 MIN: CPT

## 2019-07-08 PROCEDURE — 93000 ELECTROCARDIOGRAM COMPLETE: CPT

## 2019-07-08 RX ORDER — VALSARTAN 80 MG/1
80 TABLET, COATED ORAL
Refills: 0 | Status: DISCONTINUED | COMMUNITY
End: 2019-07-08

## 2019-07-08 RX ORDER — GLIPIZIDE 2.5 MG/1
2.5 TABLET, FILM COATED, EXTENDED RELEASE ORAL
Refills: 3 | Status: ACTIVE | COMMUNITY
Start: 2018-01-18

## 2019-07-11 ENCOUNTER — APPOINTMENT (OUTPATIENT)
Dept: MRI IMAGING | Facility: CLINIC | Age: 69
End: 2019-07-11
Payer: MEDICARE

## 2019-07-11 ENCOUNTER — OUTPATIENT (OUTPATIENT)
Dept: OUTPATIENT SERVICES | Facility: HOSPITAL | Age: 69
LOS: 1 days | End: 2019-07-11
Payer: MEDICARE

## 2019-07-11 DIAGNOSIS — Z53.8 PROCEDURE AND TREATMENT NOT CARRIED OUT FOR OTHER REASONS: Chronic | ICD-10-CM

## 2019-07-11 DIAGNOSIS — I49.3 VENTRICULAR PREMATURE DEPOLARIZATION: ICD-10-CM

## 2019-07-11 DIAGNOSIS — Z96.651 PRESENCE OF RIGHT ARTIFICIAL KNEE JOINT: Chronic | ICD-10-CM

## 2019-07-11 PROCEDURE — 75561 CARDIAC MRI FOR MORPH W/DYE: CPT | Mod: 26

## 2019-07-11 PROCEDURE — A9585: CPT

## 2019-07-11 PROCEDURE — 75561 CARDIAC MRI FOR MORPH W/DYE: CPT

## 2019-07-14 NOTE — DISCUSSION/SUMMARY
[FreeTextEntry1] : In summary the patient is a 69-year-old gentleman with hypertension, diabetes and very frequent ventricular ectopy. His echocardiogram demonstrates only LVH. I am concerned that this degree of ventricular ectopy can lead to a cardiomyopathy. In addition it would be important to know if he has a structurally normal heart. We are going to start him today metoprolol 12.5 mg twice a day and also obtain a cardiac MRI. Based on these findings we will decide how to proceed with medications or ablation.

## 2019-07-14 NOTE — HISTORY OF PRESENT ILLNESS
[FreeTextEntry1] : I had the pleasure of seeing your patient Adalberto Jeff today in arrhythmia clinic of [facility]. As you will note the patient is a delightful gentleman with a history of hypertension, hyperlipidemia and diabetes. The patient was found to have an irregular pulse and had frequent ventricular ectopy found. He underwent a cardiac workup. An echocardiogram on June 18, 2019 demonstrated normal left ventricular size and function except for mild LVH and mild dollars systolic dysfunction with an ejection fraction of 55% and left atrial enlargement at 5.6 cm. A nuclear stress test in February of 2017 showed no evidence of ischemia however there was an ejection fraction of 45% seen. The patient underwent a cardiac monitor. He had 44,000 PVCs which accounted for 35% of his beats. He has no symptoms with it he is ectopy. He denies any palpitations, lightheadedness, presyncope or syncope.\par \par Today in clinic his blood pressure was 145/75 his pulse was 81 and occasionally irregular. His EKG demonstrated sinus rhythm with frequent PVCs has had a left bundle inferior axis.

## 2019-08-10 LAB
ALBUMIN MFR SERPL ELPH: 62 %
ALBUMIN SERPL-MCNC: 4 G/DL
ALBUMIN/GLOB SERPL: 1.6 RATIO
ALPHA1 GLOB MFR SERPL ELPH: 3.9 %
ALPHA1 GLOB SERPL ELPH-MCNC: 0.3 G/DL
ALPHA2 GLOB MFR SERPL ELPH: 11.6 %
ALPHA2 GLOB SERPL ELPH-MCNC: 0.8 G/DL
B-GLOBULIN MFR SERPL ELPH: 9.3 %
B-GLOBULIN SERPL ELPH-MCNC: 0.6 G/DL
B2 MICROGLOB SERPL-MCNC: 2.1 MG/L
DEPRECATED KAPPA LC FREE/LAMBDA SER: 5.75 RATIO
DEPRECATED KAPPA LC FREE/LAMBDA SER: 5.75 RATIO
GAMMA GLOB FLD ELPH-MCNC: 0.9 G/DL
GAMMA GLOB MFR SERPL ELPH: 13.2 %
IGA SER QL IEP: 63 MG/DL
IGG SER QL IEP: 909 MG/DL
IGM SER QL IEP: 30 MG/DL
INTERPRETATION SERPL IEP-IMP: NORMAL
KAPPA LC CSF-MCNC: 0.95 MG/DL
KAPPA LC CSF-MCNC: 0.95 MG/DL
KAPPA LC SERPL-MCNC: 5.46 MG/DL
KAPPA LC SERPL-MCNC: 5.46 MG/DL
LDH SERPL-CCNC: 197 U/L
M PROTEIN MFR SERPL ELPH: 6.5 %
M PROTEIN SPEC IFE-MCNC: NORMAL
MONOCLON BAND OBS SERPL: 0.4 G/DL
PROT SERPL-MCNC: 6.5 G/DL
PROT SERPL-MCNC: 6.5 G/DL

## 2019-08-12 ENCOUNTER — APPOINTMENT (OUTPATIENT)
Dept: ELECTROPHYSIOLOGY | Facility: CLINIC | Age: 69
End: 2019-08-12

## 2019-08-28 ENCOUNTER — APPOINTMENT (OUTPATIENT)
Dept: ORTHOPEDIC SURGERY | Facility: CLINIC | Age: 69
End: 2019-08-28
Payer: MEDICARE

## 2019-08-28 VITALS
DIASTOLIC BLOOD PRESSURE: 75 MMHG | BODY MASS INDEX: 35.49 KG/M2 | WEIGHT: 213 LBS | HEART RATE: 68 BPM | HEIGHT: 65 IN | SYSTOLIC BLOOD PRESSURE: 124 MMHG

## 2019-08-28 PROCEDURE — 99214 OFFICE O/P EST MOD 30 MIN: CPT

## 2019-09-24 ENCOUNTER — OUTPATIENT (OUTPATIENT)
Dept: OUTPATIENT SERVICES | Facility: HOSPITAL | Age: 69
LOS: 1 days | End: 2019-09-24
Payer: MEDICARE

## 2019-09-24 VITALS
OXYGEN SATURATION: 96 % | SYSTOLIC BLOOD PRESSURE: 150 MMHG | TEMPERATURE: 98 F | HEIGHT: 66 IN | WEIGHT: 214.95 LBS | RESPIRATION RATE: 18 BRPM | DIASTOLIC BLOOD PRESSURE: 81 MMHG | HEART RATE: 84 BPM

## 2019-09-24 DIAGNOSIS — G56.00 CARPAL TUNNEL SYNDROME, UNSPECIFIED UPPER LIMB: Chronic | ICD-10-CM

## 2019-09-24 DIAGNOSIS — Z01.818 ENCOUNTER FOR OTHER PREPROCEDURAL EXAMINATION: ICD-10-CM

## 2019-09-24 DIAGNOSIS — Z96.651 PRESENCE OF RIGHT ARTIFICIAL KNEE JOINT: Chronic | ICD-10-CM

## 2019-09-24 DIAGNOSIS — I49.3 VENTRICULAR PREMATURE DEPOLARIZATION: ICD-10-CM

## 2019-09-24 DIAGNOSIS — Z53.8 PROCEDURE AND TREATMENT NOT CARRIED OUT FOR OTHER REASONS: Chronic | ICD-10-CM

## 2019-09-24 LAB
ALBUMIN SERPL ELPH-MCNC: 4.5 G/DL — SIGNIFICANT CHANGE UP (ref 3.3–5)
ALP SERPL-CCNC: 87 U/L — SIGNIFICANT CHANGE UP (ref 40–120)
ALT FLD-CCNC: 20 U/L — SIGNIFICANT CHANGE UP (ref 10–45)
ANION GAP SERPL CALC-SCNC: 13 MMOL/L — SIGNIFICANT CHANGE UP (ref 5–17)
APTT BLD: 28.6 SEC — SIGNIFICANT CHANGE UP (ref 27.5–36.3)
AST SERPL-CCNC: 18 U/L — SIGNIFICANT CHANGE UP (ref 10–40)
BILIRUB SERPL-MCNC: 0.6 MG/DL — SIGNIFICANT CHANGE UP (ref 0.2–1.2)
BLD GP AB SCN SERPL QL: NEGATIVE — SIGNIFICANT CHANGE UP
BUN SERPL-MCNC: 17 MG/DL — SIGNIFICANT CHANGE UP (ref 7–23)
CALCIUM SERPL-MCNC: 9.6 MG/DL — SIGNIFICANT CHANGE UP (ref 8.4–10.5)
CHLORIDE SERPL-SCNC: 100 MMOL/L — SIGNIFICANT CHANGE UP (ref 96–108)
CO2 SERPL-SCNC: 24 MMOL/L — SIGNIFICANT CHANGE UP (ref 22–31)
CREAT SERPL-MCNC: 1 MG/DL — SIGNIFICANT CHANGE UP (ref 0.5–1.3)
GLUCOSE SERPL-MCNC: 164 MG/DL — HIGH (ref 70–99)
HCT VFR BLD CALC: 46.2 % — SIGNIFICANT CHANGE UP (ref 39–50)
HGB BLD-MCNC: 14.9 G/DL — SIGNIFICANT CHANGE UP (ref 13–17)
INR BLD: 0.96 RATIO — SIGNIFICANT CHANGE UP (ref 0.88–1.16)
MCHC RBC-ENTMCNC: 30.9 PG — SIGNIFICANT CHANGE UP (ref 27–34)
MCHC RBC-ENTMCNC: 32.2 GM/DL — SIGNIFICANT CHANGE UP (ref 32–36)
MCV RBC AUTO: 95.9 FL — SIGNIFICANT CHANGE UP (ref 80–100)
PLATELET # BLD AUTO: 169 K/UL — SIGNIFICANT CHANGE UP (ref 150–400)
POTASSIUM SERPL-MCNC: 4.3 MMOL/L — SIGNIFICANT CHANGE UP (ref 3.5–5.3)
POTASSIUM SERPL-SCNC: 4.3 MMOL/L — SIGNIFICANT CHANGE UP (ref 3.5–5.3)
PROT SERPL-MCNC: 7 G/DL — SIGNIFICANT CHANGE UP (ref 6–8.3)
PROTHROM AB SERPL-ACNC: 11 SEC — SIGNIFICANT CHANGE UP (ref 10–12.9)
RBC # BLD: 4.82 M/UL — SIGNIFICANT CHANGE UP (ref 4.2–5.8)
RBC # FLD: 11.9 % — SIGNIFICANT CHANGE UP (ref 10.3–14.5)
RH IG SCN BLD-IMP: POSITIVE — SIGNIFICANT CHANGE UP
SODIUM SERPL-SCNC: 137 MMOL/L — SIGNIFICANT CHANGE UP (ref 135–145)
WBC # BLD: 6.5 K/UL — SIGNIFICANT CHANGE UP (ref 3.8–10.5)
WBC # FLD AUTO: 6.5 K/UL — SIGNIFICANT CHANGE UP (ref 3.8–10.5)

## 2019-09-24 PROCEDURE — 93010 ELECTROCARDIOGRAM REPORT: CPT

## 2019-09-24 NOTE — H&P CARDIOLOGY - PSH
Arthroscopic surgical procedure converted to open procedure  right shoulder 2011  Carpal tunnel syndrome    History of total knee arthroplasty, right

## 2019-09-24 NOTE — H&P CARDIOLOGY - PMH
Arrhythmia  SVT  Diabetes 1.5, managed as type 2    Hyperlipidemia    Hypertension    Myeloma  monitoered, seeing Dr. Allen

## 2019-09-24 NOTE — H&P CARDIOLOGY - HISTORY OF PRESENT ILLNESS
68 yo male no implantable device PMHx of T2DM (last A1c 6.6 in 8/2019), HTN, HLD and SVT presents for PST today and EPS and ablation tomorrow. Pt reports he was seen by cardiologist (Vinod Perry) for PVCs in EKG, Holter monitor showed 35% PVCs in 24 hours and referred to Dr. Sewell for further evaluation and treatement. pt denies palpitations but feels headaches with SOB with exertion.   Card: Vinod Perry  PCP Dr. Nando Mcneil 70 yo male no implantable device PMHx of T2DM (last A1c 6.6 in 8/2019), HTN, HLD, Myeloma (no Tx, monitored by Dr. Allen) and arrhythmia (SVT?) presents for PST today and EPS and ablation tomorrow. Pt reports he was seen by cardiologist (Vinod Perry) for PVCs in EKG, and referred to Dr. Sewell. Holter monitor showed 44,000 PVCs which accounted for 35% of his beats. Pt denies palpitations, dizziness or syncope but feels headaches with SOB with exertion.     Echo on 6/18/2019 demonstrated normal left ventricular size and function except for mild LVH and mild dollars systolic dysfunction with an ejection fraction of 55% and left atrial enlargement at 5.6 cm.   Nuclear stress test in 2/2017 showed no evidence of ischemia however there was an ejection fraction of 45% seen. The patient underwent a cardiac monitor. He had 44,000 PVCs which accounted for 35% of his beats.       Card: Vinod Perry  PCP Dr. Nando Mcneil    < from: MR Cardiac w/wo IV Cont (07.11.19 @ 09:33) >  QUANTITATIVE ANALYSIS   Quantitative functional parameters obtained from integration of ventricular volumes at end diastole and end systole are as follows (normal range). The quality of the images and resultant quantitative analysis is significantly degraded secondary to arrhythmia. BSA: 2.12 sq m   LVEF: 55% (normal: male = 56-78%; female = 56-78%)   LVEDV: 173 ml (normal: male =  ml; female =  ml) LVESV: 77 ml (normal: male = 19-72 ml; female = 13-51 ml) SV: 96 ml (normal: male =  ml; female =33-97) Indexed LVEDVi: 81 ml/sq m (normal: male= 47-92 ml/sq m; female= 41-81 ml/sq m)   RV measurements  RVEF: 60% (normal: male = 52-72%; female = 51-71%) RVEDV: 157 ml (normal: male = 118-250 ml; female = 77-201ml) RVESV: C3 ml (normal: male =  ml; female = 24-84 ml) SV: 94 ml (normal: male =  ml; female = )   Indexed 74: 105 ml/sq m (normal: male=  ml/sq m; female=  ml/sq m) The pericardium is normal.  Additional findings: None.Please note this examination is focused on the heart.     IMPRESSION:   1.  The LV is normal in size with borderline systolic function; LVEF 55%.   2.  The RV is normal in size normal systolic function; RVEF 60%. No evidence of ARVC.   3.  No late gadolinium enhancement to demonstrate ischemia, scar or fibrosis.   < end of copied text >

## 2019-09-25 ENCOUNTER — TRANSCRIPTION ENCOUNTER (OUTPATIENT)
Age: 69
End: 2019-09-25

## 2019-09-25 ENCOUNTER — INPATIENT (INPATIENT)
Facility: HOSPITAL | Age: 69
LOS: 0 days | Discharge: HOME | DRG: 274 | End: 2019-09-26
Attending: INTERNAL MEDICINE | Admitting: INTERNAL MEDICINE
Payer: MEDICARE

## 2019-09-25 VITALS
SYSTOLIC BLOOD PRESSURE: 120 MMHG | HEART RATE: 90 BPM | WEIGHT: 214.95 LBS | OXYGEN SATURATION: 98 % | DIASTOLIC BLOOD PRESSURE: 88 MMHG | RESPIRATION RATE: 18 BRPM | HEIGHT: 66 IN | TEMPERATURE: 98 F

## 2019-09-25 DIAGNOSIS — Z96.651 PRESENCE OF RIGHT ARTIFICIAL KNEE JOINT: Chronic | ICD-10-CM

## 2019-09-25 DIAGNOSIS — I49.3 VENTRICULAR PREMATURE DEPOLARIZATION: ICD-10-CM

## 2019-09-25 DIAGNOSIS — Z53.8 PROCEDURE AND TREATMENT NOT CARRIED OUT FOR OTHER REASONS: Chronic | ICD-10-CM

## 2019-09-25 DIAGNOSIS — G56.00 CARPAL TUNNEL SYNDROME, UNSPECIFIED UPPER LIMB: Chronic | ICD-10-CM

## 2019-09-25 LAB
BLD GP AB SCN SERPL QL: NEGATIVE — SIGNIFICANT CHANGE UP
RH IG SCN BLD-IMP: POSITIVE — SIGNIFICANT CHANGE UP

## 2019-09-25 PROCEDURE — 93621 COMP EP EVL L PAC&REC C SINS: CPT | Mod: 26

## 2019-09-25 PROCEDURE — 93010 ELECTROCARDIOGRAM REPORT: CPT | Mod: 76

## 2019-09-25 PROCEDURE — 93654 COMPRE EP EVAL TX VT: CPT

## 2019-09-25 RX ORDER — FLECAINIDE ACETATE 50 MG
100 TABLET ORAL EVERY 12 HOURS
Refills: 0 | Status: DISCONTINUED | OUTPATIENT
Start: 2019-09-25 | End: 2019-09-26

## 2019-09-25 RX ORDER — GLUCAGON INJECTION, SOLUTION 0.5 MG/.1ML
1 INJECTION, SOLUTION SUBCUTANEOUS ONCE
Refills: 0 | Status: DISCONTINUED | OUTPATIENT
Start: 2019-09-25 | End: 2019-09-26

## 2019-09-25 RX ORDER — SIMVASTATIN 20 MG/1
20 TABLET, FILM COATED ORAL AT BEDTIME
Refills: 0 | Status: DISCONTINUED | OUTPATIENT
Start: 2019-09-25 | End: 2019-09-26

## 2019-09-25 RX ORDER — ASPIRIN/CALCIUM CARB/MAGNESIUM 324 MG
81 TABLET ORAL DAILY
Refills: 0 | Status: DISCONTINUED | OUTPATIENT
Start: 2019-09-25 | End: 2019-09-25

## 2019-09-25 RX ORDER — DEXTROSE 50 % IN WATER 50 %
25 SYRINGE (ML) INTRAVENOUS ONCE
Refills: 0 | Status: DISCONTINUED | OUTPATIENT
Start: 2019-09-25 | End: 2019-09-26

## 2019-09-25 RX ORDER — LOSARTAN POTASSIUM 100 MG/1
25 TABLET, FILM COATED ORAL DAILY
Refills: 0 | Status: DISCONTINUED | OUTPATIENT
Start: 2019-09-25 | End: 2019-09-26

## 2019-09-25 RX ORDER — DEXTROSE 50 % IN WATER 50 %
15 SYRINGE (ML) INTRAVENOUS ONCE
Refills: 0 | Status: DISCONTINUED | OUTPATIENT
Start: 2019-09-25 | End: 2019-09-26

## 2019-09-25 RX ORDER — LORATADINE 10 MG/1
10 TABLET ORAL DAILY
Refills: 0 | Status: DISCONTINUED | OUTPATIENT
Start: 2019-09-25 | End: 2019-09-26

## 2019-09-25 RX ORDER — INSULIN LISPRO 100/ML
VIAL (ML) SUBCUTANEOUS
Refills: 0 | Status: DISCONTINUED | OUTPATIENT
Start: 2019-09-25 | End: 2019-09-26

## 2019-09-25 RX ORDER — VANCOMYCIN HCL 1 G
1000 VIAL (EA) INTRAVENOUS ONCE
Refills: 0 | Status: COMPLETED | OUTPATIENT
Start: 2019-09-25 | End: 2019-09-25

## 2019-09-25 RX ORDER — SODIUM CHLORIDE 9 MG/ML
1000 INJECTION, SOLUTION INTRAVENOUS
Refills: 0 | Status: DISCONTINUED | OUTPATIENT
Start: 2019-09-25 | End: 2019-09-26

## 2019-09-25 RX ORDER — FLECAINIDE ACETATE 50 MG
50 TABLET ORAL
Refills: 0 | Status: DISCONTINUED | OUTPATIENT
Start: 2019-09-25 | End: 2019-09-25

## 2019-09-25 RX ORDER — ASPIRIN/CALCIUM CARB/MAGNESIUM 324 MG
325 TABLET ORAL DAILY
Refills: 0 | Status: DISCONTINUED | OUTPATIENT
Start: 2019-09-25 | End: 2019-09-26

## 2019-09-25 RX ORDER — DEXTROSE 50 % IN WATER 50 %
12.5 SYRINGE (ML) INTRAVENOUS ONCE
Refills: 0 | Status: DISCONTINUED | OUTPATIENT
Start: 2019-09-25 | End: 2019-09-26

## 2019-09-25 RX ORDER — INSULIN LISPRO 100/ML
VIAL (ML) SUBCUTANEOUS AT BEDTIME
Refills: 0 | Status: DISCONTINUED | OUTPATIENT
Start: 2019-09-25 | End: 2019-09-26

## 2019-09-25 RX ORDER — INFLUENZA VIRUS VACCINE 15; 15; 15; 15 UG/.5ML; UG/.5ML; UG/.5ML; UG/.5ML
0.5 SUSPENSION INTRAMUSCULAR ONCE
Refills: 0 | Status: COMPLETED | OUTPATIENT
Start: 2019-09-25 | End: 2019-09-26

## 2019-09-25 RX ADMIN — SIMVASTATIN 20 MILLIGRAM(S): 20 TABLET, FILM COATED ORAL at 21:41

## 2019-09-25 RX ADMIN — Medication 325 MILLIGRAM(S): at 22:09

## 2019-09-25 RX ADMIN — LORATADINE 10 MILLIGRAM(S): 10 TABLET ORAL at 22:09

## 2019-09-25 RX ADMIN — Medication 100 MILLIGRAM(S): at 17:16

## 2019-09-25 RX ADMIN — Medication 250 MILLIGRAM(S): at 21:37

## 2019-09-26 ENCOUNTER — TRANSCRIPTION ENCOUNTER (OUTPATIENT)
Age: 69
End: 2019-09-26

## 2019-09-26 VITALS
HEART RATE: 85 BPM | DIASTOLIC BLOOD PRESSURE: 80 MMHG | TEMPERATURE: 98 F | SYSTOLIC BLOOD PRESSURE: 103 MMHG | OXYGEN SATURATION: 95 % | RESPIRATION RATE: 17 BRPM

## 2019-09-26 PROBLEM — I49.9 CARDIAC ARRHYTHMIA, UNSPECIFIED: Chronic | Status: ACTIVE | Noted: 2019-09-24

## 2019-09-26 LAB
ANION GAP SERPL CALC-SCNC: 12 MMOL/L — SIGNIFICANT CHANGE UP (ref 5–17)
BUN SERPL-MCNC: 16 MG/DL — SIGNIFICANT CHANGE UP (ref 7–23)
CALCIUM SERPL-MCNC: 8.6 MG/DL — SIGNIFICANT CHANGE UP (ref 8.4–10.5)
CHLORIDE SERPL-SCNC: 103 MMOL/L — SIGNIFICANT CHANGE UP (ref 96–108)
CO2 SERPL-SCNC: 23 MMOL/L — SIGNIFICANT CHANGE UP (ref 22–31)
CREAT SERPL-MCNC: 1.07 MG/DL — SIGNIFICANT CHANGE UP (ref 0.5–1.3)
GLUCOSE SERPL-MCNC: 143 MG/DL — HIGH (ref 70–99)
HBA1C BLD-MCNC: 6.6 % — HIGH (ref 4–5.6)
POTASSIUM SERPL-MCNC: 4.4 MMOL/L — SIGNIFICANT CHANGE UP (ref 3.5–5.3)
POTASSIUM SERPL-SCNC: 4.4 MMOL/L — SIGNIFICANT CHANGE UP (ref 3.5–5.3)
SODIUM SERPL-SCNC: 138 MMOL/L — SIGNIFICANT CHANGE UP (ref 135–145)

## 2019-09-26 PROCEDURE — C1894: CPT

## 2019-09-26 PROCEDURE — 82962 GLUCOSE BLOOD TEST: CPT

## 2019-09-26 PROCEDURE — 86901 BLOOD TYPING SEROLOGIC RH(D): CPT

## 2019-09-26 PROCEDURE — 85027 COMPLETE CBC AUTOMATED: CPT

## 2019-09-26 PROCEDURE — C1730: CPT

## 2019-09-26 PROCEDURE — 90686 IIV4 VACC NO PRSV 0.5 ML IM: CPT

## 2019-09-26 PROCEDURE — G0463: CPT

## 2019-09-26 PROCEDURE — 85730 THROMBOPLASTIN TIME PARTIAL: CPT

## 2019-09-26 PROCEDURE — 86900 BLOOD TYPING SEROLOGIC ABO: CPT

## 2019-09-26 PROCEDURE — 83036 HEMOGLOBIN GLYCOSYLATED A1C: CPT

## 2019-09-26 PROCEDURE — 85610 PROTHROMBIN TIME: CPT

## 2019-09-26 PROCEDURE — 93005 ELECTROCARDIOGRAM TRACING: CPT

## 2019-09-26 PROCEDURE — C1733: CPT

## 2019-09-26 PROCEDURE — C1760: CPT

## 2019-09-26 PROCEDURE — 86850 RBC ANTIBODY SCREEN: CPT

## 2019-09-26 PROCEDURE — 80053 COMPREHEN METABOLIC PANEL: CPT

## 2019-09-26 PROCEDURE — 93621 COMP EP EVL L PAC&REC C SINS: CPT

## 2019-09-26 PROCEDURE — 80048 BASIC METABOLIC PNL TOTAL CA: CPT

## 2019-09-26 PROCEDURE — C2630: CPT

## 2019-09-26 PROCEDURE — 93654 COMPRE EP EVAL TX VT: CPT

## 2019-09-26 RX ORDER — ASPIRIN/CALCIUM CARB/MAGNESIUM 324 MG
1 TABLET ORAL
Qty: 0 | Refills: 0 | DISCHARGE

## 2019-09-26 RX ORDER — ASPIRIN/CALCIUM CARB/MAGNESIUM 324 MG
1 TABLET ORAL
Qty: 0 | Refills: 0 | DISCHARGE
Start: 2019-09-26

## 2019-09-26 RX ORDER — IRBESARTAN 75 MG/1
1 TABLET ORAL
Qty: 0 | Refills: 0 | DISCHARGE
Start: 2019-09-26

## 2019-09-26 RX ORDER — FLECAINIDE ACETATE 50 MG
1 TABLET ORAL
Qty: 0 | Refills: 0 | DISCHARGE
Start: 2019-09-26

## 2019-09-26 RX ORDER — IRBESARTAN 75 MG/1
1 TABLET ORAL
Qty: 0 | Refills: 0 | DISCHARGE

## 2019-09-26 RX ORDER — FLECAINIDE ACETATE 50 MG
1 TABLET ORAL
Qty: 60 | Refills: 0
Start: 2019-09-26 | End: 2019-10-25

## 2019-09-26 RX ORDER — ASPIRIN/CALCIUM CARB/MAGNESIUM 324 MG
1 TABLET ORAL
Qty: 30 | Refills: 0
Start: 2019-09-26 | End: 2019-10-25

## 2019-09-26 RX ORDER — LOSARTAN POTASSIUM 100 MG/1
1 TABLET, FILM COATED ORAL
Qty: 0 | Refills: 0 | DISCHARGE
Start: 2019-09-26

## 2019-09-26 RX ADMIN — INFLUENZA VIRUS VACCINE 0.5 MILLILITER(S): 15; 15; 15; 15 SUSPENSION INTRAMUSCULAR at 10:43

## 2019-09-26 RX ADMIN — Medication 100 MILLIGRAM(S): at 06:05

## 2019-09-26 RX ADMIN — LOSARTAN POTASSIUM 25 MILLIGRAM(S): 100 TABLET, FILM COATED ORAL at 06:05

## 2019-09-26 NOTE — DISCHARGE NOTE PROVIDER - NSDCCPCAREPLAN_GEN_ALL_CORE_FT
PRINCIPAL DISCHARGE DIAGNOSIS  Diagnosis: Symptomatic PVCs  Assessment and Plan of Treatment: Your heartbeat will be controlled. Your catheter/groin site will be free of infection and severe bleeding.   Appointments: please call [phone] for follow-up appointment with your electrophysiology (EP) cardiologist within 2-3 weeks after discharge.   Groin Site Care: You can take shower in 24 hours. Keep the area clean and dry. No submersion of your catheter/groin site in bath tubs, pools, or any water for 1 week.   Activity: No driving for 24 hours. No strenuous activity or heavy lifting more than 10 pounds for 1 week.  You may need to: Quit smoking. Limit your alcohol intake.   Call Your Doctor immediately if you have: A fast or irregular heart beat; lightheadedness; severe chest pain or shortness of breath; fever or chills; bleeding, pain, redness, swelling, warmth or drainage at or near the catheter/groin site at [phone]      SECONDARY DISCHARGE DIAGNOSES  Diagnosis: HLD (hyperlipidemia)  Assessment and Plan of Treatment: Your LDL cholesterol will be less than 70mg/dL   Continue with your cholesterol medications. Eat a heart healthy diet that is low in saturated fats and salt, and includes whole grains, fruits, vegetables and lean protein; exercise regularly (consult with your physician or cardiologist first); maintain a heart healthy weight. Continue to follow with your primary physician or cardiologist for treatment goals, continue medication, have liver function testing every 3 months as anti lipid medications can cause liver irritation. If you smoke - quit (A resource to help you stop smoking is the [facility] for Tobacco Control – phone number [phone].).    Diagnosis: HTN (hypertension)  Assessment and Plan of Treatment: Your blood pressure will be controlled.   Continue with your blood pressure medications; eat a heart healthy diet with low salt diet; exercise regularly (consult with your physician or cardiologist first); maintain a heart healthy weight; if you smoke - quit (A resource to help you stop smoking is the [facility] for Tobacco Control – phone number [phone].); include healthy ways to manage stress. Continue to follow with your primary care physician or cardiologist. PRINCIPAL DISCHARGE DIAGNOSIS  Diagnosis: Symptomatic PVCs  Assessment and Plan of Treatment: Your heartbeat will be controlled. Your catheter/groin site will be free of infection and severe bleeding.   Appointments: please call [phone] for follow-up appointment with your electrophysiology (EP) cardiologist within 2-3 weeks after discharge.   Groin Site Care: You can take shower in 24 hours. Keep the area clean and dry. No submersion of your catheter/groin site in bath tubs, pools, or any water for 1 week.   Activity: No driving for 24 hours. No strenuous activity or heavy lifting more than 10 pounds for 1 week.  You may need to: Quit smoking. Limit your alcohol intake.   Call Your Doctor immediately if you have: A fast or irregular heart beat; lightheadedness; severe chest pain or shortness of breath; fever or chills; bleeding, pain, redness, swelling, warmth or drainage at or near the catheter/groin site at [phone]      SECONDARY DISCHARGE DIAGNOSES  Diagnosis: HTN (hypertension)  Assessment and Plan of Treatment: Continue with your blood pressure medications; eat a heart healthy diet with low salt diet; exercise regularly (consult with your physician or cardiologist first); maintain a heart healthy weight; if you smoke - quit (A resource to help you stop smoking is the [facility] – phone number [phone].); include healthy ways to manage stress. Continue to follow with your primary care physician or cardiologist.      Diagnosis: HLD (hyperlipidemia)  Assessment and Plan of Treatment: Your LDL cholesterol will be less than 70mg/dL   Continue with your cholesterol medications. Eat a heart healthy diet that is low in saturated fats and salt, and includes whole grains, fruits, vegetables and lean protein; exercise regularly (consult with your physician or cardiologist first); maintain a heart healthy weight. Continue to follow with your primary physician or cardiologist for treatment goals, continue medication, have liver function testing every 3 months as anti lipid medications can cause liver irritation. If you smoke - quit (A resource to help you stop smoking is the [facility] – phone number [phone].).    Diagnosis: HTN (hypertension)  Assessment and Plan of Treatment: Your blood pressure will be controlled.   Continue with your blood pressure medications; eat a heart healthy diet with low salt diet; exercise regularly (consult with your physician or cardiologist first); maintain a heart healthy weight; if you smoke - quit (A resource to help you stop smoking is the [facility] for Tobacco [facility] – phone number [phone].); include healthy ways to manage stress. Continue to follow with your primary care physician or cardiologist.

## 2019-09-26 NOTE — PROGRESS NOTE ADULT - SUBJECTIVE AND OBJECTIVE BOX
SUBJECTIVE:  NO CP or SOB   	  MEDICATIONS:  flecainide 100 milliGRAM(s) Oral every 12 hours  losartan 25 milliGRAM(s) Oral daily    loratadine 10 milliGRAM(s) Oral daily    dextrose 40% Gel 15 Gram(s) Oral once PRN  dextrose 50% Injectable 12.5 Gram(s) IV Push once  dextrose 50% Injectable 25 Gram(s) IV Push once  dextrose 50% Injectable 25 Gram(s) IV Push once  glucagon  Injectable 1 milliGRAM(s) IntraMuscular once PRN  insulin lispro (HumaLOG) corrective regimen sliding scale   SubCutaneous three times a day before meals  insulin lispro (HumaLOG) corrective regimen sliding scale   SubCutaneous at bedtime  simvastatin 20 milliGRAM(s) Oral at bedtime    aspirin enteric coated 325 milliGRAM(s) Oral daily  dextrose 5%. 1000 milliLiter(s) IV Continuous <Continuous>  influenza   Vaccine 0.5 milliLiter(s) IntraMuscular once      REVIEW OF SYSTEMS:    CONSTITUTIONAL: No fever, weight loss, or fatigue  EYES: No eye pain, visual disturbances, or discharge  NECK: No pain or stiffness  RESPIRATORY: No cough, wheezing, chills or hemoptysis; No Shortness of Breath  CARDIOVASCULAR: No chest pain, palpitations, dizziness, or leg swelling  GASTROINTESTINAL: No abdominal or epigastric pain. No nausea, vomiting, or hematemesis; No diarrhea or constipation. No melena or hematochezia.  GENITOURINARY: No dysuria, frequency, hematuria, or incontinence  NEUROLOGICAL: No headaches, memory loss, loss of strength, numbness, or tremors  SKIN: No itching, burning, rashes, or lesions   LYMPH Nodes: No enlarged glands  MUSCULOSKELETAL: No joint pain or swelling; No muscle, back, or extremity pain  All other review of systems are negative.  	  PHYSICAL EXAM:  T(C): 36.6 (09-26-19 @ 04:47), Max: 36.9 (09-25-19 @ 20:45)  HR: 84 (09-26-19 @ 07:50) (84 - 97)  BP: 115/68 (09-26-19 @ 07:50) (98/54 - 133/87)  RR: 18 (09-26-19 @ 07:50) (18 - 18)  SpO2: 96% (09-26-19 @ 07:50) (95% - 99%)  Wt(kg): --  I&O's Summary    25 Sep 2019 07:01  -  26 Sep 2019 07:00  --------------------------------------------------------  IN: 600 mL / OUT: 900 mL / NET: -300 mL    26 Sep 2019 07:01  -  26 Sep 2019 10:24  --------------------------------------------------------  IN: 240 mL / OUT: 0 mL / NET: 240 mL      Height (cm): 167.6 (09-25 @ 15:15)  Weight (kg): 97.522 (09-25 @ 15:15)  BMI (kg/m2): 34.7 (09-25 @ 15:15)  BSA (m2): 2.06 (09-25 @ 15:15)    PHYSICAL EXAM    Appearance: Normal	  HEENT:   Normal oral mucosa, PERRL, EOMI	  NECK: Soft and supple, No LAD, No JVD  Cardiovascular: Regular Rate and Rhythm, Normal S1 S2, No murmurs, No clicks, gallops or rubs  Respiratory: Lungs clear to auscultation	  Gastrointestinal:  Soft, Non-tender, + BS	  Skin: No rashes, No ecchymoses, No cyanosis  Neurologic: Non-focal  Extremities: No clubbing, cyanosis or edema  Vascular: Peripheral pulses palpable 2+ bilaterally    TELEMETRY: 	        LABS:	 	                            14.3   9.0   )-----------( 155      ( 26 Sep 2019 05:12 )             43.4     09-26    138  |  103  |  16  ----------------------------<  143<H>  4.4   |  23  |  1.07    Ca    8.6      26 Sep 2019 05:11      proBNP:   Lipid Profile:   HgA1c: Hemoglobin A1C, Whole Blood: 6.6 % (09-26 @ 07:49)

## 2019-09-26 NOTE — DISCHARGE NOTE PROVIDER - CARE PROVIDER_API CALL
Desiree Sewell (MD)  Cardiac Electrophysiology; Cardiovascular Disease; Internal Medicine  [address] [place] [address]  Phone: [phone]  Fax: [phone]  Follow Up Time: Desiree Sewell)  Cardiac Electrophysiology; Cardiovascular Disease; Internal Medicine  [address] [place] [address]  Phone: [phone]  Fax: [phone]  Follow Up Time:     Vinod Perry)  Cardiovascular Disease; Internal Medicine  [address] [place] [address], [address]  [place] [address]  Phone: [phone]  Fax: [phone]  Follow Up Time:

## 2019-09-26 NOTE — PROGRESS NOTE ADULT - ASSESSMENT
70 yo male PMHx of T2DM (last A1c 6.6 in 8/2019), HTN, HLD, Myeloma (no Tx, monitored by Dr. Allen) and possible SV T in the past, sent to EPS for PVCs in EKG, and referred to Dr. Sewell. Holter monitor showed 44,000 PVCs which accounted for 35% of his beats. Now s/p PVC ablation via LFV access. Pt tolerated the procedure well, site benign.  - Minimal tenderness at R groin site  - occasional PVC on monitor  - Starting Flecainide per EPS  - f/u with Dr Sewell and [place] on DC

## 2019-09-26 NOTE — DISCHARGE NOTE PROVIDER - CARE PROVIDERS DIRECT ADDRESSES
[email or web address]

## 2019-09-26 NOTE — DISCHARGE NOTE PROVIDER - NSDCCPTREATMENT_GEN_ALL_CORE_FT
PRINCIPAL PROCEDURE  Procedure: Atrial ablation  Findings and Treatment: PVCs ablations PRINCIPAL PROCEDURE  Procedure: Ablation, cardiac  Findings and Treatment: PVC ablation

## 2019-09-26 NOTE — DISCHARGE NOTE PROVIDER - HOSPITAL COURSE
70 yo male no implantable device PMHx of T2DM (last A1c 6.6 in 8/2019), HTN, HLD, Myeloma (no Tx, monitored by Dr. Allen) and arrhythmia (SVT?) presents for PST today and EPS and ablation tomorrow. Pt reports he was seen by cardiologist (Vinod Perry) for PVCs in EKG, and referred to Dr. Sewell. Holter monitor showed 44,000 PVCs which accounted for 35% of his beats. Pt denies palpitations, dizziness or syncope but feels headaches with SOB with exertion. Pt is no s/p PVC ablation via LFV access. Pt tolerated the procedure well, site benign. Post-procedure discharge intructions discuss and questions addressec             09/25 S/p PVV ablation 68 yo male no implantable device PMHx of T2DM (last A1c 6.6 in 8/2019), HTN, HLD, Myeloma (no Tx, monitored by Dr. Allen) and arrhythmia (SVT?) presents for PST today and EPS and ablation tomorrow. Pt reports he was seen by cardiologist (Vinod Perry) for PVCs in EKG, and referred to Dr. Sewell. Holter monitor showed 44,000 PVCs which accounted for 35% of his beats. Pt denies palpitations, dizziness or syncope but feels headaches with SOB with exertion.     s/p PVC ablation via LFV access. Pt tolerated the procedure well, site benign. No hematoma, no bleeding +DP    ambulating without complaint - no CP, SOB, ANDERSON, palps or dizziness     Post-procedure discharge instructions and teaching completed         Seen by Dr Sewell and cleared for discharge to home. continue flecainide. stop BB.     Follow up with Dr Sewell in 1 month -and will need Holter monitor as outpatient 1 week prior to out patient visit with Dr Sewell (same arranged with [employer]).        Pt also seen by private cards Dr Burks this am and cleared for discharge home. Follow up with Dr Perry as instructed 68 yo male no implantable device PMHx of T2DM (last A1c 6.6 in 8/2019), HTN, HLD, Myeloma (no Tx, monitored by Dr. Allen) and arrhythmia (SVT?) presents for PST today and EPS and ablation tomorrow. Pt reports he was seen by cardiologist (Vinod Perry) for PVCs in EKG, and referred to Dr. Sewell. Holter monitor showed 44,000 PVCs which accounted for 35% of his beats. Pt denies palpitations, dizziness or syncope but feels headaches with SOB with exertion.     s/p PVC ablation via LFV access. Pt tolerated the procedure well, site benign. No hematoma, no bleeding +DP    ambulating without complaint - no CP, SOB, ANDERSON, palps or dizziness     Post-procedure discharge instructions and teaching completed         Seen by Dr Sewell and cleared for discharge to home. continue flecainide. stop BB. ASA 325mg x 1 month     Follow up with Dr Sewell in 1 month -and will need Holter monitor as outpatient 1 week prior to out patient visit with Dr Sewell (same arranged with [employer]).        Pt also seen by private cards Dr Burks this am and cleared for discharge home. Follow up with Dr Perry as instructed 70 yo male no implantable device PMHx of T2DM (last A1c 6.6 in 8/2019), HTN, HLD, Myeloma (no Tx, monitored by Dr. Allen) and arrhythmia (SVT?) presents for PST today and EPS and ablation tomorrow. Pt reports he was seen by cardiologist (Vinod Perry) for PVCs in EKG, and referred to Dr. Sewell. Holter monitor showed 44,000 PVCs which accounted for 35% of his beats. Pt denies palpitations, dizziness or syncope but feels headaches with SOB with exertion.     s/p PVC ablation via LFV access. Pt tolerated the procedure well, site benign. No hematoma, no bleeding +DP    ambulating without complaint - no CP, SOB, ANDERSON, palps or dizziness     Post-procedure discharge instructions and teaching completed         Seen by Dr Sewell and cleared for discharge to home. continue flecainide. stop BB. ASA 325mg x 1 month then resume baby asa    Follow up with Dr Sewell in 1 month -and will need Holter monitor as outpatient 1 week prior to out patient visit with Dr Sewell (same arranged with [employer]).        Pt also seen by private cards Dr Burks this am and cleared for discharge home. Follow up with Dr Perry as instructed

## 2019-09-26 NOTE — DISCHARGE NOTE NURSING/CASE MANAGEMENT/SOCIAL WORK - PATIENT PORTAL LINK FT
You can access the FollowMyHealth Patient Portal offered by [facility] by registering at the following website: [email or web address]. By joining [employer]’s FollowMyHealth portal, you will also be able to view your health information using other applications (apps) compatible with our system.

## 2019-09-26 NOTE — DISCHARGE NOTE NURSING/CASE MANAGEMENT/SOCIAL WORK - NSDCFUADDAPPT_GEN_ALL_CORE_FT
Follow-up 11/04/2019 1:15pm . ASA FOR 4 WEEKS. NO Metoprolol  continue flecainide  holter monitor 1 week before follow up with Dr Sewell (arranged)

## 2019-09-26 NOTE — DISCHARGE NOTE PROVIDER - PROVIDER TOKENS
PROVIDER:[TOKEN:[[id number]]] PROVIDER:[TOKEN:[[id number]]],PROVIDER:[TOKEN:[[id number]]]

## 2019-09-26 NOTE — DISCHARGE NOTE PROVIDER - NSDCACTIVITY_GEN_ALL_CORE
Walking - Indoors allowed/No heavy lifting/straining/Walking - Outdoors allowed No heavy lifting/straining/Showering allowed/Walking - Outdoors allowed/Walking - Indoors allowed

## 2019-09-26 NOTE — DISCHARGE NOTE PROVIDER - NSDCFUADDAPPT_GEN_ALL_CORE_FT
Follow-up 11/04/2019 1:15pm . ASA FOR 4 WEEKS. NO Metoprolol Follow-up 11/04/2019 1:15pm . ASA FOR 4 WEEKS. NO Metoprolol  continue flecainide Follow-up 11/04/2019 1:15pm . ASA FOR 4 WEEKS. NO Metoprolol  continue flecainide  holter monitor 1 week before follow up with Dr Sewell (arranged)

## 2019-10-07 ENCOUNTER — CHART COPY (OUTPATIENT)
Age: 69
End: 2019-10-07

## 2019-11-04 ENCOUNTER — APPOINTMENT (OUTPATIENT)
Dept: ELECTROPHYSIOLOGY | Facility: CLINIC | Age: 69
End: 2019-11-04

## 2019-11-11 ENCOUNTER — NON-APPOINTMENT (OUTPATIENT)
Age: 69
End: 2019-11-11

## 2019-11-11 ENCOUNTER — APPOINTMENT (OUTPATIENT)
Dept: ELECTROPHYSIOLOGY | Facility: CLINIC | Age: 69
End: 2019-11-11
Payer: MEDICARE

## 2019-11-11 VITALS
DIASTOLIC BLOOD PRESSURE: 73 MMHG | SYSTOLIC BLOOD PRESSURE: 119 MMHG | HEART RATE: 79 BPM | BODY MASS INDEX: 35.78 KG/M2 | OXYGEN SATURATION: 98 % | WEIGHT: 215 LBS

## 2019-11-11 VITALS — HEIGHT: 65 IN

## 2019-11-11 PROCEDURE — 93000 ELECTROCARDIOGRAM COMPLETE: CPT

## 2019-11-11 PROCEDURE — 99215 OFFICE O/P EST HI 40 MIN: CPT

## 2019-11-11 NOTE — REVIEW OF SYSTEMS
[Dyspnea on exertion] : dyspnea during exertion [Negative] : Endocrine [Chest  Pressure] : no chest pressure [Chest Pain] : no chest pain [Lower Ext Edema] : no extremity edema [Leg Claudication] : no intermittent leg claudication [Palpitations] : no palpitations

## 2019-11-11 NOTE — DISCUSSION/SUMMARY
[FreeTextEntry1] : In summary the patient is a 69-year-old gentleman with hypertension, diabetes and very frequent ventricular ectopy who underwent PVC ablation on 9/25/19 of an anterolateral RVOT PVC. A second left septal parahisian PVC was noted but not ablated due to the risk of heart block. Flecainide has successfully suppressed his remaining ectopy, which is low off flecainide. His new complaint of dyspnea walking up the stairs or a hill does not appear to be arrhythmic in nature. He episodes of PVCs or AT on the event monitor do not correlate with times of exertion. Suspect nonarrhythmic cause of his dyspnea. At this time, I recommend a repeat exercise stress test to evaluate his new dyspnea on exertion.

## 2019-11-11 NOTE — HISTORY OF PRESENT ILLNESS
[FreeTextEntry1] : I had the pleasure of seeing your patient Adalberto Scanlon today in arrhythmia clinic of [facility]. As you well know the patient is a delightful 69 year old gentleman with a history of hypertension, hyperlipidemia and diabetes. The patient was found to have an irregular pulse and had frequent ventricular ectopy found. He underwent a cardiac workup. An echocardiogram on June 18, 2019 demonstrated normal left ventricular size and function except for mild LVH and mild systolic dysfunction with an ejection fraction of 55% and left atrial enlargement at 5.6 cm. A nuclear stress test in February of 2017 showed no evidence of ischemia however there was an ejection fraction of 45% seen. The patient underwent a cardiac monitor. He had 44,000 PVCs which accounted for 35% of his beats. He has no symptoms with it he is ectopy. \par \par He underwent an EPS and ablation on September 25, 2019 of an anterolateral RVOT PVC, which was the predominant PVC during that case. A second PVC was also noted during the study which had been seen clinically as well on prior EKGs. The second PVC was mapped to the left septum and found to be parahisian. A decision was made not to ablate that PVC given the risk of heart block. He was subsequently re-started on flecainide and a event monitor showed almost complete suppression of his PVCs. More recently, his flecainide was held and a subsequent event monitor showed 4776 PVCs (2.1% burden) over 24 hours and short runs of atrial tachycardia. \par \par Today the patient presents with a HR of 68 bpm and a blood pressure of 124/75 mmHg. His EKG is NSR with no PVCs. The patient states that he has not felt any palpitations since being off the flecainide. However, he notes dyspnea on exertion when going up a flight of stairs or walking up a hill. He feels is coincided with stopping the medication. He is participating in physical therapy without limitations. He also stated that when he was on flecainide he was experiencing hot flashes which resolved after being off the medications.

## 2019-11-11 NOTE — PHYSICAL EXAM
[General Appearance - Well Developed] : well developed [General Appearance - Well Nourished] : well nourished [Normal Appearance] : normal appearance [Normal Oral Mucosa] : normal oral mucosa [Normal Conjunctiva] : the conjunctiva exhibited no abnormalities [Normal Oropharynx] : normal oropharynx [Respiration, Rhythm And Depth] : normal respiratory rhythm and effort [Exaggerated Use Of Accessory Muscles For Inspiration] : no accessory muscle use [Heart Rate And Rhythm] : heart rate and rhythm were normal [Heart Sounds] : normal S1 and S2 [Murmurs] : no murmurs present [Arterial Pulses Normal] : the arterial pulses were normal [Abdomen Soft] : soft [Bowel Sounds] : normal bowel sounds [Abnormal Walk] : normal gait [Abdomen Tenderness] : non-tender [Cyanosis, Localized] : no localized cyanosis [Nail Clubbing] : no clubbing of the fingernails [Petechial Hemorrhages (___cm)] : no petechial hemorrhages [Skin Color & Pigmentation] : normal skin color and pigmentation [Skin Turgor] : normal skin turgor [] : no rash [Oriented To Time, Place, And Person] : oriented to person, place, and time [Impaired Insight] : insight and judgment were intact [No Anxiety] : not feeling anxious [FreeTextEntry1] : No JVD

## 2019-12-23 ENCOUNTER — NON-APPOINTMENT (OUTPATIENT)
Age: 69
End: 2019-12-23

## 2019-12-23 ENCOUNTER — APPOINTMENT (OUTPATIENT)
Dept: ELECTROPHYSIOLOGY | Facility: CLINIC | Age: 69
End: 2019-12-23
Payer: MEDICARE

## 2019-12-23 VITALS
DIASTOLIC BLOOD PRESSURE: 80 MMHG | WEIGHT: 220 LBS | HEIGHT: 65 IN | BODY MASS INDEX: 36.65 KG/M2 | HEART RATE: 80 BPM | OXYGEN SATURATION: 98 % | SYSTOLIC BLOOD PRESSURE: 132 MMHG

## 2019-12-23 PROCEDURE — 93000 ELECTROCARDIOGRAM COMPLETE: CPT

## 2019-12-23 PROCEDURE — 99214 OFFICE O/P EST MOD 30 MIN: CPT

## 2019-12-24 ENCOUNTER — CHART COPY (OUTPATIENT)
Age: 69
End: 2019-12-24

## 2019-12-24 RX ORDER — METOPROLOL SUCCINATE 25 MG/1
25 TABLET, EXTENDED RELEASE ORAL DAILY
Qty: 45 | Refills: 2 | Status: DISCONTINUED | COMMUNITY
Start: 2019-07-08 | End: 2019-12-24

## 2019-12-24 NOTE — DISCUSSION/SUMMARY
[FreeTextEntry1] : In summary the patient is a 61-year-old gentleman who initially presented with very frequent PVCs. He is status post ablation and he seems to only have a limited number from the area around the His bundle. I do not think any other treatment is required for these. However he is now having symptomatic atrial rhythm he is. Prior monitors he did have brief runs of atrial tachycardia and I suspect this is what has led to his atrial fibrillation now. These would be consistent with a pulmonary vein focus. We discussed the underlying pathophysiology and treatment options at length. He could try antiarrhythmics, he had tolerated the flecainide well for an ablation would be a possibility. It is interesting that he was not having these atrial arrhythmias while he was on the floor tonight for his PVCs so I think this is a reasonable option. The patient is amenable to this. We will start Apixiban for anticoagulation given atrial fibrillation, he will go on low-dose diltiazem 120 mg and flecainide 100 mg b.i.d. He will consider the possibility of an ablation in the future.

## 2019-12-24 NOTE — REASON FOR VISIT
[Follow-Up - Clinic] : a clinic follow-up of [Atrial Fibrillation] : atrial fibrillation [Spouse] : spouse [FreeTextEntry1] : PVCs

## 2019-12-24 NOTE — HISTORY OF PRESENT ILLNESS
[FreeTextEntry1] : I had the pleasure of seeing your patient Adalberto Scanlon today in arrhythmia clinic of [facility]. As you well know the patient is a delightful 69 year old gentleman with a history of hypertension, hyperlipidemia and diabetes. The patient was found to have an irregular pulse and had frequent ventricular ectopy found. He underwent a cardiac workup. An echocardiogram on June 18, 2019 demonstrated normal left ventricular size and function except for mild LVH and mild systolic dysfunction with an ejection fraction of 55% and left atrial enlargement at 5.6 cm. A nuclear stress test in February of 2017 showed no evidence of ischemia however there was an ejection fraction of 45% seen. The patient underwent a cardiac monitor. He had 44,000 PVCs which accounted for 35% of his beats. He has no symptoms with it he is ectopy. \par \par He underwent an EPS and ablation on September 25, 2019 of an anterolateral RVOT PVC, which was the predominant PVC during that case. A second PVC was also noted during the study which had been seen clinically as well on prior EKGs. The second PVC was mapped to the left septum and found to be parahisian. A decision was made not to ablate that PVC given the risk of heart block. He was subsequently re-started on flecainide and a event monitor showed almost complete suppression of his PVCs. More recently, his flecainide was held and a subsequent event monitor showed 4776 PVCs (2.1% burden) over 24 hours and short runs of atrial tachycardia. The patient continued off AAs and called with symptoms of fatigue and palpitations. We sent him a monitor and he returns for follow-up.\par \par Today in clinic the patient is overall doing fairly well. He has described episodes of chest pressure which correlated with episodes of atrial fibrillation during his monitoring. During the monitor he had brief episodes of atrial tachycardia but a more sustained episode of atrial fibrillation with fairly rapid ventricular response. He still has occasional PVCs but certainly not near as many as he did prior to the ablation. His blood pressure today was 132/80 and his pulse was 86. His EKG revealed sinus rhythm with frequent APCs and LVH. \par \par

## 2019-12-26 ENCOUNTER — OUTPATIENT (OUTPATIENT)
Dept: OUTPATIENT SERVICES | Facility: HOSPITAL | Age: 69
LOS: 1 days | Discharge: HOME | End: 2019-12-26

## 2019-12-26 DIAGNOSIS — D47.2 MONOCLONAL GAMMOPATHY: ICD-10-CM

## 2019-12-26 DIAGNOSIS — Z53.8 PROCEDURE AND TREATMENT NOT CARRIED OUT FOR OTHER REASONS: Chronic | ICD-10-CM

## 2019-12-26 DIAGNOSIS — G56.00 CARPAL TUNNEL SYNDROME, UNSPECIFIED UPPER LIMB: Chronic | ICD-10-CM

## 2019-12-26 DIAGNOSIS — Z96.651 PRESENCE OF RIGHT ARTIFICIAL KNEE JOINT: Chronic | ICD-10-CM

## 2019-12-30 ENCOUNTER — APPOINTMENT (OUTPATIENT)
Dept: ELECTROPHYSIOLOGY | Facility: CLINIC | Age: 69
End: 2019-12-30
Payer: MEDICARE

## 2019-12-30 ENCOUNTER — NON-APPOINTMENT (OUTPATIENT)
Age: 69
End: 2019-12-30

## 2019-12-30 VITALS
WEIGHT: 220 LBS | OXYGEN SATURATION: 98 % | HEART RATE: 79 BPM | HEIGHT: 65 IN | BODY MASS INDEX: 36.65 KG/M2 | SYSTOLIC BLOOD PRESSURE: 127 MMHG | DIASTOLIC BLOOD PRESSURE: 70 MMHG

## 2019-12-30 PROCEDURE — 93000 ELECTROCARDIOGRAM COMPLETE: CPT

## 2019-12-30 PROCEDURE — 99212 OFFICE O/P EST SF 10 MIN: CPT

## 2019-12-31 ENCOUNTER — RESULT REVIEW (OUTPATIENT)
Age: 69
End: 2019-12-31

## 2019-12-31 ENCOUNTER — APPOINTMENT (OUTPATIENT)
Dept: HEMATOLOGY ONCOLOGY | Facility: CLINIC | Age: 69
End: 2019-12-31
Payer: MEDICARE

## 2019-12-31 VITALS
TEMPERATURE: 98 F | OXYGEN SATURATION: 94 % | RESPIRATION RATE: 17 BRPM | DIASTOLIC BLOOD PRESSURE: 82 MMHG | WEIGHT: 220.24 LBS | HEART RATE: 69 BPM | BODY MASS INDEX: 36.65 KG/M2 | SYSTOLIC BLOOD PRESSURE: 157 MMHG

## 2019-12-31 LAB
BASOPHILS # BLD AUTO: 0 K/UL — SIGNIFICANT CHANGE UP (ref 0–0.2)
BASOPHILS NFR BLD AUTO: 0.7 % — SIGNIFICANT CHANGE UP (ref 0–2)
EOSINOPHIL # BLD AUTO: 0.2 K/UL — SIGNIFICANT CHANGE UP (ref 0–0.5)
EOSINOPHIL NFR BLD AUTO: 3.8 % — SIGNIFICANT CHANGE UP (ref 0–6)
HCT VFR BLD CALC: 43.4 % — SIGNIFICANT CHANGE UP (ref 39–50)
HGB BLD-MCNC: 14.7 G/DL — SIGNIFICANT CHANGE UP (ref 13–17)
LYMPHOCYTES # BLD AUTO: 1.4 K/UL — SIGNIFICANT CHANGE UP (ref 1–3.3)
LYMPHOCYTES # BLD AUTO: 26.2 % — SIGNIFICANT CHANGE UP (ref 13–44)
MCHC RBC-ENTMCNC: 32.2 PG — SIGNIFICANT CHANGE UP (ref 27–34)
MCHC RBC-ENTMCNC: 33.7 G/DL — SIGNIFICANT CHANGE UP (ref 32–36)
MCV RBC AUTO: 95.4 FL — SIGNIFICANT CHANGE UP (ref 80–100)
MONOCYTES # BLD AUTO: 0.6 K/UL — SIGNIFICANT CHANGE UP (ref 0–0.9)
MONOCYTES NFR BLD AUTO: 11.3 % — SIGNIFICANT CHANGE UP (ref 2–14)
NEUTROPHILS # BLD AUTO: 3.1 K/UL — SIGNIFICANT CHANGE UP (ref 1.8–7.4)
NEUTROPHILS NFR BLD AUTO: 57.9 % — SIGNIFICANT CHANGE UP (ref 43–77)
PLATELET # BLD AUTO: 164 K/UL — SIGNIFICANT CHANGE UP (ref 150–400)
RBC # BLD: 4.56 M/UL — SIGNIFICANT CHANGE UP (ref 4.2–5.8)
RBC # FLD: 11.8 % — SIGNIFICANT CHANGE UP (ref 10.3–14.5)
WBC # BLD: 5.4 K/UL — SIGNIFICANT CHANGE UP (ref 3.8–10.5)
WBC # FLD AUTO: 5.4 K/UL — SIGNIFICANT CHANGE UP (ref 3.8–10.5)

## 2019-12-31 PROCEDURE — 99213 OFFICE O/P EST LOW 20 MIN: CPT

## 2019-12-31 NOTE — ASSESSMENT
[Palliative] : Goals of care discussed with patient: Palliative [Palliative Care Plan] : not applicable at this time [FreeTextEntry1] : MGUS - stable for several years ; he remains at higher risk on account of  abnormal quantitative free serum light chain ratio.  \par His disease has remained stable since he was first seen here over 6 years ago. No c/o suggestive of myeloma or NHL.\par Repeat CMP, LDH, quant free serum light chains and ratio, B2MG, SPEP, quant. immunoglobulins today\par CBC wnl today.\par Continue f/u of cardiac arrhythmia with Dr. Sewell.\par \par Received flu shot this year.\par Recommended that he receive the Shingrix and pneumococcal vaccine. \par  \par RV 6 months.

## 2019-12-31 NOTE — HISTORY OF PRESENT ILLNESS
[FreeTextEntry1] : I had the pleasure of seeing your patient Adalberto Scanlon today in arrhythmia clinic of [facility]. As you well know the patient is a delightful 69 year old gentleman with a history of hypertension, hyperlipidemia and diabetes. The patient was found to have an irregular pulse and had frequent ventricular ectopy found. He underwent a cardiac workup. An echocardiogram on June 18, 2019 demonstrated normal left ventricular size and function except for mild LVH and mild systolic dysfunction with an ejection fraction of 55% and left atrial enlargement at 5.6 cm. A nuclear stress test in February of 2017 showed no evidence of ischemia however there was an ejection fraction of 45% seen. The patient underwent a cardiac monitor. He had 44,000 PVCs which accounted for 35% of his beats. He has no symptoms with it he is ectopy. \par \par He underwent an EPS and ablation on September 25, 2019 of an anterolateral RVOT PVC, which was the predominant PVC during that case. A second PVC was also noted during the study which had been seen clinically as well on prior EKGs. The second PVC was mapped to the left septum and found to be parahisian. A decision was made not to ablate that PVC given the risk of heart block. He was subsequently re-started on flecainide and a event monitor showed almost complete suppression of his PVCs. More recently, his flecainide was held and a subsequent event monitor showed 4776 PVCs (2.1% burden) over 24 hours and short runs of atrial tachycardia. The patient continued off AAs and called with symptoms of fatigue and palpitations. He was found to have runs of symptomatic atrial fibrillation and was placed on Eliquis, Flecainde and Diltiazem. He continued to have palpitations and some chest pressure and come back in today.\par \par The patient reports episodes of palpitation associated with left chest pressure. He did have a recent stress test in November 2019 which was benign.

## 2019-12-31 NOTE — REASON FOR VISIT
[Follow-Up - Clinic] : a clinic follow-up of [Palpitations] : palpitations [Atrial Fibrillation] : atrial fibrillation

## 2019-12-31 NOTE — PHYSICAL EXAM
[Fully active, able to carry on all pre-disease performance without restriction] : Status 0 - Fully active, able to carry on all pre-disease performance without restriction [Normal] : affect appropriate [de-identified] : i [de-identified] : Irregularly irregular, around 70  [de-identified] : no JVD, or calf tenderness, (+) venous stasis changes, no edema [de-identified] : shotty igunial nodes.  [de-identified] : No CVAT  [de-identified] : Shotty inguinal nodes.

## 2019-12-31 NOTE — DISCUSSION/SUMMARY
[FreeTextEntry1] : In summary the patient is a 61-year-old gentleman with a history of prior frequent PVCs status post ablation. He is now having episodes of atrial fibrillation and one of the triggers it appears to be conflict with his wife. We discussed the options. This included continued medical management versus catheter ablation. We discussed the procedures, outcomes and risks of ablation at length. Risks discussed included but were not limited to bleeding, perforation, need for pericardial tap, need for emergent surgery, stroke, heart attack, atrial esophageal fistula and pulmonary vein stenosis. After all questions were answered the patient is leaning towards an atrial fibrillation ablation. For now he will remain on his current medications and we will provide him with a two-week monitor.

## 2019-12-31 NOTE — HISTORY OF PRESENT ILLNESS
[Disease:__________________________] : Disease: [unfilled] [de-identified] : Adalberto Scanlon, [place] [facility] [place].  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  He has some degree of unsteadiness in his gait but no falls.  Of note, he has a history of type II diabetes.\par \par He underwent a neurologic evaluation by Dr. Beatrice Ta.  An MRI of the LS spine from October 2011 showed moderate left parasagittal and femoral disc herniations at L3-L4 which were new compared to a prior study in 2005.  Compression of the left L3 nerve root was seen as well as mild compression of the left lateral aspect of the thecal sac.  Additional smaller areas of herniation were seen at L1-L2 and L4-L5. 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  The MRI was repeated on June 27, 2012 and this showed no significant changes. \par \par Immunoprotein studies were performed to further complete the neuropathy evaluation and a small M spike of 0.5 g/dl was seen along with an IgG monoclonal kappa light chain protein.  The vitamin B12 level was normal.  A Lyme titer was negative. \par \par The patient was then seen by Dr. Lauri Gilbert and a bone marrow showed trilineage maturation along with an IgG kappa plasmacytosis averaging less than 10% of the overall marrow cellularity.  The plasma cells expressed , CD20 and BCL1.  Rare paratrabecular lymphoid aggregates were seen made up of small lymphoid cells which consisted of a mixture of B and T cells and were negative for CD5 and CD10.  \par \par A skeletal survey showed no lytic or blastic lesions.\par \par 24 hour urine showed no light chain excretion.\par \par The chemistries demonstrated a normal creatinine and calcium levels as well as liver function.  A beta-2 microglobulin was normal.  The free kappa serum light chain ratio was abnormal (5.08, 0.26-1.65) with a mild elevation of the free kappa serum light chain to 36.1 mg/L.\par \par He has since had no evidence of progression of disease and has not needed treatment to date.\par \par  [de-identified] : mgus, kappa light chain [FreeTextEntry1] : no therapy to date [de-identified] : MGUS - M spike remains stable, kappa light chain and quant free light chain ratio in 5-6 range.  Stable with about 6 yrs observation - minimal increase in kappa free light chains. Creat wnl as well as Ca in June, 2019. \par Remains afebrile, he has had no respiratory infections\par Low Back Pain -He continues to have low back pain, radiating to the left leg. This has been a chronic problem. He continues to see chiropractor Dr. Amaro which has helped. He is also implementing physical therapy for tx. 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 Gait better.\par CBC is wnl, with plt count normal today at 164K, Hgb 14.7 and WBC 5.4. Last M-spike was 0.4 in 8/2019. Renal function remains normal.\par Major recent complaints are related to cardiac arrhythmia, both PBCs and PAF.\par PBCs have been largely eliminated with ablation done in 9/ 2019 . He remains in AF with rate regulated on Diltiazem, Flecainide.

## 2019-12-31 NOTE — ADDENDUM
[FreeTextEntry1] : Documented by Hayley Ortiz acting as a scribe for Dr. Germán Allen on 12/31/2019\par \par All medical record entries made by a scribe were at my, Dr. Germán Allen direction and personally dictated by me on 12/31/2019 . I have reviewed the chart and agree that the record accurately reflects my personal performance of the history, physical exam, procedure and imaging.\par \par

## 2020-02-10 ENCOUNTER — APPOINTMENT (OUTPATIENT)
Dept: ELECTROPHYSIOLOGY | Facility: CLINIC | Age: 70
End: 2020-02-10

## 2020-02-18 ENCOUNTER — OUTPATIENT (OUTPATIENT)
Dept: OUTPATIENT SERVICES | Facility: HOSPITAL | Age: 70
LOS: 1 days | End: 2020-02-18
Payer: MEDICARE

## 2020-02-18 ENCOUNTER — APPOINTMENT (OUTPATIENT)
Dept: CV DIAGNOSITCS | Facility: HOSPITAL | Age: 70
End: 2020-02-18

## 2020-02-18 VITALS
TEMPERATURE: 98 F | HEIGHT: 65 IN | DIASTOLIC BLOOD PRESSURE: 81 MMHG | WEIGHT: 220.02 LBS | RESPIRATION RATE: 12 BRPM | OXYGEN SATURATION: 98 % | HEART RATE: 80 BPM | SYSTOLIC BLOOD PRESSURE: 129 MMHG

## 2020-02-18 VITALS
RESPIRATION RATE: 18 BRPM | HEIGHT: 64.96 IN | HEART RATE: 72 BPM | DIASTOLIC BLOOD PRESSURE: 76 MMHG | OXYGEN SATURATION: 95 % | WEIGHT: 220.46 LBS | SYSTOLIC BLOOD PRESSURE: 136 MMHG

## 2020-02-18 DIAGNOSIS — G56.00 CARPAL TUNNEL SYNDROME, UNSPECIFIED UPPER LIMB: Chronic | ICD-10-CM

## 2020-02-18 DIAGNOSIS — Z01.818 ENCOUNTER FOR OTHER PREPROCEDURAL EXAMINATION: ICD-10-CM

## 2020-02-18 DIAGNOSIS — Z96.651 PRESENCE OF RIGHT ARTIFICIAL KNEE JOINT: Chronic | ICD-10-CM

## 2020-02-18 DIAGNOSIS — Z53.8 PROCEDURE AND TREATMENT NOT CARRIED OUT FOR OTHER REASONS: Chronic | ICD-10-CM

## 2020-02-18 DIAGNOSIS — I48.91 UNSPECIFIED ATRIAL FIBRILLATION: ICD-10-CM

## 2020-02-18 LAB
ALBUMIN SERPL ELPH-MCNC: 4.5 G/DL — SIGNIFICANT CHANGE UP (ref 3.3–5)
ALP SERPL-CCNC: 91 U/L — SIGNIFICANT CHANGE UP (ref 40–120)
ALT FLD-CCNC: 23 U/L — SIGNIFICANT CHANGE UP (ref 10–45)
ANION GAP SERPL CALC-SCNC: 12 MMOL/L — SIGNIFICANT CHANGE UP (ref 5–17)
APTT BLD: 32.9 SEC — SIGNIFICANT CHANGE UP (ref 27.5–36.3)
AST SERPL-CCNC: 18 U/L — SIGNIFICANT CHANGE UP (ref 10–40)
BILIRUB SERPL-MCNC: 0.6 MG/DL — SIGNIFICANT CHANGE UP (ref 0.2–1.2)
BLD GP AB SCN SERPL QL: NEGATIVE — SIGNIFICANT CHANGE UP
BUN SERPL-MCNC: 16 MG/DL — SIGNIFICANT CHANGE UP (ref 7–23)
CALCIUM SERPL-MCNC: 9.2 MG/DL — SIGNIFICANT CHANGE UP (ref 8.4–10.5)
CHLORIDE SERPL-SCNC: 101 MMOL/L — SIGNIFICANT CHANGE UP (ref 96–108)
CO2 SERPL-SCNC: 24 MMOL/L — SIGNIFICANT CHANGE UP (ref 22–31)
CREAT SERPL-MCNC: 1.01 MG/DL — SIGNIFICANT CHANGE UP (ref 0.5–1.3)
GLUCOSE SERPL-MCNC: 145 MG/DL — HIGH (ref 70–99)
HCT VFR BLD CALC: 46.2 % — SIGNIFICANT CHANGE UP (ref 39–50)
HGB BLD-MCNC: 14.8 G/DL — SIGNIFICANT CHANGE UP (ref 13–17)
INR BLD: 1.18 RATIO — HIGH (ref 0.88–1.16)
MCHC RBC-ENTMCNC: 29.7 PG — SIGNIFICANT CHANGE UP (ref 27–34)
MCHC RBC-ENTMCNC: 32 GM/DL — SIGNIFICANT CHANGE UP (ref 32–36)
MCV RBC AUTO: 92.6 FL — SIGNIFICANT CHANGE UP (ref 80–100)
NRBC # BLD: 0 /100 WBCS — SIGNIFICANT CHANGE UP (ref 0–0)
PLATELET # BLD AUTO: 180 K/UL — SIGNIFICANT CHANGE UP (ref 150–400)
POTASSIUM SERPL-MCNC: 4.4 MMOL/L — SIGNIFICANT CHANGE UP (ref 3.5–5.3)
POTASSIUM SERPL-SCNC: 4.4 MMOL/L — SIGNIFICANT CHANGE UP (ref 3.5–5.3)
PROT SERPL-MCNC: 7.3 G/DL — SIGNIFICANT CHANGE UP (ref 6–8.3)
PROTHROM AB SERPL-ACNC: 13.6 SEC — HIGH (ref 10–12.9)
RBC # BLD: 4.99 M/UL — SIGNIFICANT CHANGE UP (ref 4.2–5.8)
RBC # FLD: 12.8 % — SIGNIFICANT CHANGE UP (ref 10.3–14.5)
RH IG SCN BLD-IMP: POSITIVE — SIGNIFICANT CHANGE UP
SODIUM SERPL-SCNC: 137 MMOL/L — SIGNIFICANT CHANGE UP (ref 135–145)
WBC # BLD: 6.91 K/UL — SIGNIFICANT CHANGE UP (ref 3.8–10.5)
WBC # FLD AUTO: 6.91 K/UL — SIGNIFICANT CHANGE UP (ref 3.8–10.5)

## 2020-02-18 PROCEDURE — 93312 ECHO TRANSESOPHAGEAL: CPT | Mod: 26

## 2020-02-18 PROCEDURE — 93010 ELECTROCARDIOGRAM REPORT: CPT | Mod: 59

## 2020-02-18 PROCEDURE — 93306 TTE W/DOPPLER COMPLETE: CPT | Mod: 26

## 2020-02-18 RX ORDER — DILTIAZEM HCL 120 MG
1 CAPSULE, EXT RELEASE 24 HR ORAL
Qty: 0 | Refills: 0 | DISCHARGE

## 2020-02-18 RX ORDER — SODIUM CHLORIDE 9 MG/ML
3 INJECTION INTRAMUSCULAR; INTRAVENOUS; SUBCUTANEOUS EVERY 8 HOURS
Refills: 0 | Status: DISCONTINUED | OUTPATIENT
Start: 2020-02-18 | End: 2020-03-04

## 2020-02-18 RX ORDER — APIXABAN 2.5 MG/1
1 TABLET, FILM COATED ORAL
Qty: 0 | Refills: 0 | DISCHARGE

## 2020-02-18 RX ORDER — LEVOCETIRIZINE DIHYDROCHLORIDE 0.5 MG/ML
1 SOLUTION ORAL
Qty: 0 | Refills: 0 | DISCHARGE

## 2020-02-18 RX ORDER — SIMVASTATIN 20 MG/1
1 TABLET, FILM COATED ORAL
Qty: 0 | Refills: 0 | DISCHARGE

## 2020-02-18 NOTE — PRE-ANESTHESIA EVALUATION ADULT - NSANTHPMHFT_GEN_ALL_CORE
70 yo  male (denies implantable device) with PMHx of T2DM (last A1c 6.6 in 8/2019), HTN, HLD, Myeloma (no Tx, monitored by Dr. Allen) presents today for presurgical testing and KHLOE for scheduled Afib ablation on 2/19/2020. Recent EPS and ablation on 9/25/19- restarted on flecainide (did not ablate PVC concern for HB)- event monitor at that time showed suppression of PVC's.  Flecainide held and given event monitor- showed PVC's, atrial tachycardia and runs of symptomatic afib- now placed on Eliquis and dilt along with Flecainde. He continues to report intermittent left sided chest pressure and palpitations and SOB. 68 yo  male (denies implantable device) with PMHx of T2DM (last A1c 6.6 in 8/2019), HTN, HLD, Myeloma (no Tx, monitored by Dr. Allen) presents today for presurgical testing and KHLOE for scheduled Afib ablation on 2/19/2020. Recent EPS and ablation on 9/25/19- restarted on flecainide (did not ablate PVC concern for HB)- event monitor at that time showed suppression of PVC's.  Flecainide held and given event monitor- showed PVC's, atrial tachycardia and runs of symptomatic afib- now placed on Eliquis and dilt along with Flecainde. He continues to report intermittent left sided chest pressure and palpitations and SOB.    METS>4, denies CP or hx of stroke/tia. endorses chronic neck/back pain/disc herniations. denies numbness/weakness

## 2020-02-18 NOTE — PRE-ANESTHESIA EVALUATION ADULT - NSANTHOSAYNRD_GEN_A_CORE
No. ERIK screening performed.  STOP BANG Legend: 0-2 = LOW Risk; 3-4 = INTERMEDIATE Risk; 5-8 = HIGH Risk

## 2020-02-18 NOTE — H&P CARDIOLOGY - HISTORY OF PRESENT ILLNESS
70 yo  male (denies implantable device) with PMHx of T2DM (last A1c 6.6 in 8/2019), HTN, HLD, Myeloma (no Tx, monitored by Dr. Allen) presents today for presurgical testing and KHLOE for scheduled Afib ablation on 2/19/2020. Recent EPS and ablation on 9/25/19- restarted on flecainide (did not ablate PVC concern for HB)- event monitor at that time showed suppression of PVC's.  Flecainide held and given event monitor- showed PVC's, atrial tachycardia and runs of symptomatic afib- now placed on Eliquis and dilt along with Flecainde. He continues to report intermittent left sided chest pressure and palpitations. Pt currently denies palpitations, dizziness or syncope but feels headaches with SOB with exertion.     Echo on 6/18/2019 demonstrated normal left ventricular size and function except for mild LVH and mild dollars systolic dysfunction with an ejection fraction of 55% and left atrial enlargement at 5.6 cm.      Card: Vinod Perry 68 yo  male (denies implantable device) with PMHx of T2DM (last A1c 6.6 in 8/2019), HTN, HLD, Myeloma (no Tx, monitored by Dr. Allen) presents today for presurgical testing and KHLOE for scheduled Afib ablation on 2/19/2020. Recent EPS and ablation on 9/25/19- restarted on flecainide (did not ablate PVC concern for HB)- event monitor at that time showed suppression of PVC's.  Flecainide held and given event monitor- showed PVC's, atrial tachycardia and runs of symptomatic afib- now placed on Eliquis and dilt along with Flecainde. He continues to report intermittent left sided chest pressure and palpitations and SOB.     Echo on 6/18/2019 demonstrated normal left ventricular size and function except for mild LVH and mild dollars systolic dysfunction with an ejection fraction of 55% and left atrial enlargement at 5.6 cm.      Card: Vinod Perry 70 yo  male (denies implantable device) with PMHx of T2DM (last A1c 6.6 in 8/2019), HTN, HLD, Myeloma (no Tx, monitored by Dr. Allen) presents today for presurgical testing and KHLOE for scheduled Afib ablation on 2/19/2020. Recent EPS and ablation on 9/25/19- restarted on flecainide (did not ablate PVC concern for HB)- event monitor at that time showed suppression of PVC's.  Flecainide held and given event monitor- showed PVC's, atrial tachycardia and runs of symptomatic afib- now placed on Eliquis and dilt along with Flecainde. He continues to report intermittent left sided chest pressure and palpitations and SOB.     Echo on 6/18/2019 demonstrated normal left ventricular size and function except for mild LVH and mild systolic dysfunction with an ejection fraction of 55% and left atrial enlargement at 5.6 cm.      Card: Vinod Perry

## 2020-02-18 NOTE — H&P CARDIOLOGY - PMH
Afib    Arrhythmia  SVT  Diabetes 1.5, managed as type 2    Hyperlipidemia    Hypertension    Myeloma  monitoered, seeing Dr. Allen  Peripheral neuropathy    PVC (premature ventricular contraction)    Rotator cuff tear

## 2020-02-19 ENCOUNTER — TRANSCRIPTION ENCOUNTER (OUTPATIENT)
Age: 70
End: 2020-02-19

## 2020-02-19 ENCOUNTER — INPATIENT (INPATIENT)
Facility: HOSPITAL | Age: 70
LOS: 0 days | Discharge: HOME | DRG: 274 | End: 2020-02-20
Attending: INTERNAL MEDICINE | Admitting: INTERNAL MEDICINE
Payer: COMMERCIAL

## 2020-02-19 DIAGNOSIS — G56.00 CARPAL TUNNEL SYNDROME, UNSPECIFIED UPPER LIMB: Chronic | ICD-10-CM

## 2020-02-19 DIAGNOSIS — I48.91 UNSPECIFIED ATRIAL FIBRILLATION: ICD-10-CM

## 2020-02-19 DIAGNOSIS — Z96.651 PRESENCE OF RIGHT ARTIFICIAL KNEE JOINT: Chronic | ICD-10-CM

## 2020-02-19 DIAGNOSIS — Z53.8 PROCEDURE AND TREATMENT NOT CARRIED OUT FOR OTHER REASONS: Chronic | ICD-10-CM

## 2020-02-19 PROCEDURE — 93657 TX L/R ATRIAL FIB ADDL: CPT

## 2020-02-19 PROCEDURE — 93010 ELECTROCARDIOGRAM REPORT: CPT | Mod: 77

## 2020-02-19 PROCEDURE — 93662 INTRACARDIAC ECG (ICE): CPT | Mod: 26

## 2020-02-19 PROCEDURE — 93613 INTRACARDIAC EPHYS 3D MAPG: CPT

## 2020-02-19 PROCEDURE — ZZZZZ: CPT

## 2020-02-19 PROCEDURE — 93010 ELECTROCARDIOGRAM REPORT: CPT

## 2020-02-19 PROCEDURE — 93656 COMPRE EP EVAL ABLTJ ATR FIB: CPT

## 2020-02-19 RX ORDER — DILTIAZEM HCL 120 MG
120 CAPSULE, EXT RELEASE 24 HR ORAL DAILY
Refills: 0 | Status: DISCONTINUED | OUTPATIENT
Start: 2020-02-19 | End: 2020-02-20

## 2020-02-19 RX ORDER — INSULIN LISPRO 100/ML
VIAL (ML) SUBCUTANEOUS
Refills: 0 | Status: DISCONTINUED | OUTPATIENT
Start: 2020-02-19 | End: 2020-02-20

## 2020-02-19 RX ORDER — LOSARTAN POTASSIUM 100 MG/1
25 TABLET, FILM COATED ORAL DAILY
Refills: 0 | Status: DISCONTINUED | OUTPATIENT
Start: 2020-02-19 | End: 2020-02-20

## 2020-02-19 RX ORDER — DEXTROSE 50 % IN WATER 50 %
12.5 SYRINGE (ML) INTRAVENOUS ONCE
Refills: 0 | Status: DISCONTINUED | OUTPATIENT
Start: 2020-02-19 | End: 2020-02-20

## 2020-02-19 RX ORDER — DEXTROSE 50 % IN WATER 50 %
15 SYRINGE (ML) INTRAVENOUS ONCE
Refills: 0 | Status: DISCONTINUED | OUTPATIENT
Start: 2020-02-19 | End: 2020-02-20

## 2020-02-19 RX ORDER — DEXTROSE 50 % IN WATER 50 %
25 SYRINGE (ML) INTRAVENOUS ONCE
Refills: 0 | Status: DISCONTINUED | OUTPATIENT
Start: 2020-02-19 | End: 2020-02-20

## 2020-02-19 RX ORDER — APIXABAN 2.5 MG/1
5 TABLET, FILM COATED ORAL
Refills: 0 | Status: DISCONTINUED | OUTPATIENT
Start: 2020-02-19 | End: 2020-02-20

## 2020-02-19 RX ORDER — FLECAINIDE ACETATE 50 MG
100 TABLET ORAL
Refills: 0 | Status: DISCONTINUED | OUTPATIENT
Start: 2020-02-19 | End: 2020-02-20

## 2020-02-19 RX ORDER — PANTOPRAZOLE SODIUM 20 MG/1
40 TABLET, DELAYED RELEASE ORAL
Refills: 0 | Status: DISCONTINUED | OUTPATIENT
Start: 2020-02-19 | End: 2020-02-20

## 2020-02-19 RX ORDER — LORATADINE 10 MG/1
10 TABLET ORAL DAILY
Refills: 0 | Status: DISCONTINUED | OUTPATIENT
Start: 2020-02-19 | End: 2020-02-20

## 2020-02-19 RX ORDER — INSULIN LISPRO 100/ML
VIAL (ML) SUBCUTANEOUS AT BEDTIME
Refills: 0 | Status: DISCONTINUED | OUTPATIENT
Start: 2020-02-19 | End: 2020-02-20

## 2020-02-19 RX ORDER — SODIUM CHLORIDE 9 MG/ML
1000 INJECTION, SOLUTION INTRAVENOUS
Refills: 0 | Status: DISCONTINUED | OUTPATIENT
Start: 2020-02-19 | End: 2020-02-20

## 2020-02-19 RX ORDER — GLUCAGON INJECTION, SOLUTION 0.5 MG/.1ML
1 INJECTION, SOLUTION SUBCUTANEOUS ONCE
Refills: 0 | Status: DISCONTINUED | OUTPATIENT
Start: 2020-02-19 | End: 2020-02-20

## 2020-02-19 RX ORDER — SIMVASTATIN 20 MG/1
10 TABLET, FILM COATED ORAL AT BEDTIME
Refills: 0 | Status: DISCONTINUED | OUTPATIENT
Start: 2020-02-19 | End: 2020-02-20

## 2020-02-19 RX ADMIN — LORATADINE 10 MILLIGRAM(S): 10 TABLET ORAL at 22:10

## 2020-02-19 RX ADMIN — Medication 120 MILLIGRAM(S): at 22:10

## 2020-02-19 RX ADMIN — SIMVASTATIN 10 MILLIGRAM(S): 20 TABLET, FILM COATED ORAL at 22:10

## 2020-02-19 RX ADMIN — SODIUM CHLORIDE 3 MILLILITER(S): 9 INJECTION INTRAMUSCULAR; INTRAVENOUS; SUBCUTANEOUS at 16:37

## 2020-02-19 RX ADMIN — SODIUM CHLORIDE 3 MILLILITER(S): 9 INJECTION INTRAMUSCULAR; INTRAVENOUS; SUBCUTANEOUS at 22:06

## 2020-02-19 RX ADMIN — Medication 2: at 17:20

## 2020-02-19 RX ADMIN — APIXABAN 5 MILLIGRAM(S): 2.5 TABLET, FILM COATED ORAL at 17:22

## 2020-02-19 RX ADMIN — Medication 100 MILLIGRAM(S): at 17:22

## 2020-02-19 NOTE — DISCHARGE NOTE PROVIDER - CARE PROVIDER_API CALL
Desiree Sewell)  Cardiac Electrophysiology; Cardiovascular Disease; Internal Medicine  [address] [place] [address]  Phone: [phone]  Fax: [phone]  Follow Up Time:     Vinod Perry)  Cardiovascular Disease; Internal Medicine  [address] [place] [address], [address]  [place] [address]  Phone: [phone]  Fax: [phone]  Follow Up Time:

## 2020-02-19 NOTE — DISCHARGE NOTE PROVIDER - NSDCFUSCHEDAPPT_GEN_ALL_CORE_FT
MARLEN TAMAYO ; 03/02/2020 ; [facility] OrthoSurg [address] [facility]  MARLEN TAMAYO ; 04/06/2020 ; [facility] Cardio [facility] [address]  MARLEN TAMAYO ; 03/02/2020 ; [facility] OrthoSurg [address] [facility]  MARLEN TAMAYO ; 04/06/2020 ; [facility] Cardio [facility] [address]  MARLEN TAMAYO ; 03/02/2020 ; [facility] OrthoSurg [address] [facility]  MARLEN TAMAYO ; 04/06/2020 ; [facility] Cardio Electro [address]  MARLEN TAMAYO ; 03/02/2020 ; [facility] OrthoSurg [address] [facility]  MARLEN TAMAYO ; 04/06/2020 ; [facility] Cardio Electro [address]  MARLEN TAMAYO ; 03/02/2020 ; [facility] OrthoSurg [address] [facility]  MARLEN TAMAYO ; 04/06/2020 ; [facility] Cardio Electro [address]

## 2020-02-19 NOTE — DISCHARGE NOTE PROVIDER - NSDCFUADDINST_GEN_ALL_CORE_FT
Atrial fibrillation is the most common heart rhythm problem & has the risk of stroke & heart attack  It helps if you control your blood pressure, not drink more than 1-2 alcohol drinks per day, cut down on caffeine, getting treatment for over active thyroid gland, & getting exercise  Call your doctor if you feel your heart racing or beating unusually, chest tightness or pain, lightheaded, faint, shortness of breath especially with exercise  It is important to take your heart medication as prescribed  You may be on anticoagulation which is very important to take as directed - you may need blood work to monitor drug levels

## 2020-02-19 NOTE — DISCHARGE NOTE PROVIDER - NSDCCPCAREPLAN_GEN_ALL_CORE_FT
PRINCIPAL DISCHARGE DIAGNOSIS  Diagnosis: S/P ablation of atrial fibrillation  Assessment and Plan of Treatment: Your heartbeat will be controlled. Your catheter/groin site will be free of infection and severe bleeding. Please continue all medications as prescribed by your doctor. PRINCIPAL DISCHARGE DIAGNOSIS  Diagnosis: S/P ablation of atrial fibrillation  Assessment and Plan of Treatment: No heavy lifting, strenuous activity, and driving for 2 days. You may shower 24 hours following procedure but avoid baths and swimming for 1 week. Check groin site for bleeding and/or swelling daily following procedure. Call your doctor/cardiologist immediately for bleeding or swelling or if you have increased/persistent pain or drainage at the site.      SECONDARY DISCHARGE DIAGNOSES  Diagnosis: Atrial fibrillation  Assessment and Plan of Treatment: Continue with your cardiologist and primary care MD. Continue your current medications. Call your physician for palpitations, feelings of rapid heart beat, lightheadedness, or dizziness. Continue your anticoagulation medication as instructed.    Diagnosis: Diabetes  Assessment and Plan of Treatment: Continue to follow with your primary care MD or your endocrinologist.  Follow a heart healthy diabetic diet. If you check your fingerstick glucose at home, call your MD if it is greater than 250mg/dL on 2 occasions or less than 100mg/dL on 2 occasions. Know signs of low blood sugar, such as: dizziness, shakiness, sweating, confusion, hunger, nervousness-drink 4 ounces apple juice if occurs and call your doctor. Know early signs of high blood sugar, such as: frequent urination, increased thirst, blurry vision, fatigue, headache - call your doctor if this occurs. Follow with other practitioners to care for your diabetes, such as ophthamologist and podiatrist.       Diagnosis: HLD (hyperlipidemia)  Assessment and Plan of Treatment: Continue with your cholesterol medications. Eat a heart healthy diet that is low in saturated fats and salt, and includes whole grains, fruits, vegetables and lean protein; exercise regularly (consult with your physician or cardiologist first); maintain a heart healthy weight; if you smoke - quit (A resource to help you stop smoking is the [facility] [employer] – phone number [phone].). Continue to follow with your primary physician or cardiologist.    Diagnosis: Benign essential HTN  Assessment and Plan of Treatment: Continue with your blood pressure medications; eat a heart healthy diet with low salt diet; exercise regularly (consult with your physician or cardiologist first); maintain a heart healthy weight; if you smoke - quit (A resource to help you stop smoking is the [facility] [employer] Tobacco [facility] – phone number [phone].); include healthy ways to manage stress. Continue to follow with your primary care physician or cardiologist.

## 2020-02-19 NOTE — PROVIDER CONTACT NOTE (OTHER) - BACKGROUND
s/p A fib ablation. As per pt, the episode happened post procedure in PACU.  the episode happened in CSSU for 3 times., but it lasts a few minutes and gone.  FS is 165

## 2020-02-19 NOTE — DISCHARGE NOTE PROVIDER - INSTRUCTIONS
You should continue to maintain a heart healthy diet ( low cholesterol, low sodium, and low in saturated fats).  You should also continue to eat a soft diet ( mash potatoes, yogurts, soft cooked vegetables, soups) for one month. This will prevent any irritation to your esophagus which may be weakened due to the procedure.   You also should take your carafate and protonix medications as prescribed for one month  to reduce acid build up in your stomach; which can further irritate your esophagus.

## 2020-02-19 NOTE — DISCHARGE NOTE PROVIDER - NSDCFUADDAPPT_GEN_ALL_CORE_FT
Please follow up with Dr. Sewell 4/6/2020 at 1:30pm. Please follow up with Dr. Sewell 4/6/2020 at 1:30pm.  Please follow up with Dr. ePrry in 3-4 weeks. Please follow up with Dr. Sewell 4/6/2020 at 1:30pm.  Please follow up with Dr. Perry in 3-4 weeks.  Dr. Sewell will have his office set up out patient sleep study.

## 2020-02-19 NOTE — DISCHARGE NOTE PROVIDER - HOSPITAL COURSE
HPI:    70 yo  male (denies implantable device) with PMHx of T2DM (last A1c 6.6 in 8/2019), HTN, HLD, Myeloma (no Tx, monitored by Dr. Allen) presents today for presurgical testing and KHLOE for scheduled Afib ablation on 2/19/2020. Recent EPS and ablation on 9/25/19- restarted on flecainide (did not ablate PVC concern for HB)- event monitor at that time showed suppression of PVC's.    Flecainide held and given event monitor- showed PVC's, atrial tachycardia and runs of symptomatic afib- now placed on Eliquis and dilt along with Flecainde. He continues to report intermittent left sided chest pressure and palpitations and SOB.         Echo on 6/18/2019 demonstrated normal left ventricular size and function except for mild LVH and mild systolic dysfunction with an ejection fraction of 55% and left atrial enlargement at 5.6 cm.            Card: Vinod Perry        2/19/2020    Pt is s/p Afib ablation. Pt tolerated procedure well, b/l groin sites benign. Ready for discharge home.

## 2020-02-19 NOTE — CHART NOTE - NSCHARTNOTEFT_GEN_A_CORE
68 yo  male (denies implantable device) with PMHx of T2DM (last A1c 6.6 in 8/2019), HTN, HLD, Myeloma (no Tx, monitored by Dr. Allen) presents today for presurgical testing and KHLOE for scheduled Afib ablation on 2/19/2020. Recent EPS and ablation on 9/25/19- restarted on flecainide (did not ablate PVC concern for HB)- event monitor at that time showed suppression of PVC's.  Flecainide held and given event monitor- showed PVC's, atrial tachycardia and runs of symptomatic afib- now placed on Eliquis and dilt along with Flecainde. He continues to report intermittent left sided chest pressure and palpitations and SOB.     Echo on 6/18/2019 demonstrated normal left ventricular size and function except for mild LVH and mild systolic dysfunction with an ejection fraction of 55% and left atrial enlargement at 5.6 cm.      Card: Vinod Perry (18 Feb 2020 12:25)    S/P Afib Ablation  -Bilateral sutures removed  -site benign  -Ambulate in an hour  -Cont Eliquis tonight

## 2020-02-19 NOTE — DISCHARGE NOTE PROVIDER - NSDCMRMEDTOKEN_GEN_ALL_CORE_FT
dilTIAZem 120 mg/24 hours oral capsule, extended release: 1 cap(s) orally once a day  Eliquis 5 mg oral tablet: 1 tab(s) orally 2 times a day  flecainide 100 mg oral tablet: 1 tab(s) orally 2 times a day MDD:2  glipiZIDE 2.5 mg oral tablet, extended release: 1 tab(s) orally 2 times a day  irbesartan 75 mg oral tablet: 1 tab(s) orally every other day  Januvia 100 mg oral tablet: 1 tab(s) orally once a day  levocetirizine 5 mg oral tablet: 1 tab(s) orally once a day (in the evening)  simvastatin 10 mg oral tablet: 1 tab(s) orally once a day (at bedtime) dilTIAZem 120 mg/24 hours oral capsule, extended release: 1 cap(s) orally once a day  Eliquis 5 mg oral tablet: 1 tab(s) orally 2 times a day  flecainide 100 mg oral tablet: 1 tab(s) orally 2 times a day MDD:2  glipiZIDE 2.5 mg oral tablet, extended release: 1 tab(s) orally 2 times a day  irbesartan 75 mg oral tablet: 1 tab(s) orally every other day  Januvia 100 mg oral tablet: 1 tab(s) orally once a day  levocetirizine 5 mg oral tablet: 1 tab(s) orally once a day (in the evening)  pantoprazole 40 mg oral delayed release tablet: 1 tab(s) orally once a day (before a meal)  simvastatin 10 mg oral tablet: 1 tab(s) orally once a day (at bedtime)

## 2020-02-20 ENCOUNTER — TRANSCRIPTION ENCOUNTER (OUTPATIENT)
Age: 70
End: 2020-02-20

## 2020-02-20 VITALS
RESPIRATION RATE: 17 BRPM | HEART RATE: 74 BPM | TEMPERATURE: 98 F | DIASTOLIC BLOOD PRESSURE: 87 MMHG | SYSTOLIC BLOOD PRESSURE: 105 MMHG | OXYGEN SATURATION: 98 %

## 2020-02-20 DIAGNOSIS — Z98.890 OTHER SPECIFIED POSTPROCEDURAL STATES: ICD-10-CM

## 2020-02-20 PROBLEM — I48.91 UNSPECIFIED ATRIAL FIBRILLATION: Chronic | Status: ACTIVE | Noted: 2020-02-18

## 2020-02-20 PROBLEM — I49.3 VENTRICULAR PREMATURE DEPOLARIZATION: Chronic | Status: ACTIVE | Noted: 2020-02-18

## 2020-02-20 PROBLEM — M75.100 UNSPECIFIED ROTATOR CUFF TEAR OR RUPTURE OF UNSPECIFIED SHOULDER, NOT SPECIFIED AS TRAUMATIC: Chronic | Status: ACTIVE | Noted: 2020-02-18

## 2020-02-20 PROBLEM — G62.9 POLYNEUROPATHY, UNSPECIFIED: Chronic | Status: ACTIVE | Noted: 2020-02-18

## 2020-02-20 LAB
ANION GAP SERPL CALC-SCNC: 13 MMOL/L — SIGNIFICANT CHANGE UP (ref 5–17)
BUN SERPL-MCNC: 20 MG/DL — SIGNIFICANT CHANGE UP (ref 7–23)
CALCIUM SERPL-MCNC: 9 MG/DL — SIGNIFICANT CHANGE UP (ref 8.4–10.5)
CHLORIDE SERPL-SCNC: 100 MMOL/L — SIGNIFICANT CHANGE UP (ref 96–108)
CO2 SERPL-SCNC: 21 MMOL/L — LOW (ref 22–31)
CREAT SERPL-MCNC: 0.95 MG/DL — SIGNIFICANT CHANGE UP (ref 0.5–1.3)
GLUCOSE SERPL-MCNC: 165 MG/DL — HIGH (ref 70–99)
HCT VFR BLD CALC: 43.8 % — SIGNIFICANT CHANGE UP (ref 39–50)
HGB BLD-MCNC: 13.8 G/DL — SIGNIFICANT CHANGE UP (ref 13–17)
MCHC RBC-ENTMCNC: 29.7 PG — SIGNIFICANT CHANGE UP (ref 27–34)
MCHC RBC-ENTMCNC: 31.5 GM/DL — LOW (ref 32–36)
MCV RBC AUTO: 94.2 FL — SIGNIFICANT CHANGE UP (ref 80–100)
NRBC # BLD: 0 /100 WBCS — SIGNIFICANT CHANGE UP (ref 0–0)
PLATELET # BLD AUTO: 184 K/UL — SIGNIFICANT CHANGE UP (ref 150–400)
POTASSIUM SERPL-MCNC: 4.6 MMOL/L — SIGNIFICANT CHANGE UP (ref 3.5–5.3)
POTASSIUM SERPL-SCNC: 4.6 MMOL/L — SIGNIFICANT CHANGE UP (ref 3.5–5.3)
RBC # BLD: 4.65 M/UL — SIGNIFICANT CHANGE UP (ref 4.2–5.8)
RBC # FLD: 13.2 % — SIGNIFICANT CHANGE UP (ref 10.3–14.5)
SODIUM SERPL-SCNC: 134 MMOL/L — LOW (ref 135–145)
WBC # BLD: 10.21 K/UL — SIGNIFICANT CHANGE UP (ref 3.8–10.5)
WBC # FLD AUTO: 10.21 K/UL — SIGNIFICANT CHANGE UP (ref 3.8–10.5)

## 2020-02-20 PROCEDURE — C1732: CPT

## 2020-02-20 PROCEDURE — 85027 COMPLETE CBC AUTOMATED: CPT

## 2020-02-20 PROCEDURE — C1759: CPT

## 2020-02-20 PROCEDURE — C1730: CPT

## 2020-02-20 PROCEDURE — 93622 COMP EP EVAL L VENTR PAC&REC: CPT

## 2020-02-20 PROCEDURE — 99239 HOSP IP/OBS DSCHRG MGMT >30: CPT

## 2020-02-20 PROCEDURE — 86900 BLOOD TYPING SEROLOGIC ABO: CPT

## 2020-02-20 PROCEDURE — 86850 RBC ANTIBODY SCREEN: CPT

## 2020-02-20 PROCEDURE — 93010 ELECTROCARDIOGRAM REPORT: CPT | Mod: 76

## 2020-02-20 PROCEDURE — 93657 TX L/R ATRIAL FIB ADDL: CPT

## 2020-02-20 PROCEDURE — 82962 GLUCOSE BLOOD TEST: CPT

## 2020-02-20 PROCEDURE — 86901 BLOOD TYPING SEROLOGIC RH(D): CPT

## 2020-02-20 PROCEDURE — 93613 INTRACARDIAC EPHYS 3D MAPG: CPT

## 2020-02-20 PROCEDURE — 93306 TTE W/DOPPLER COMPLETE: CPT

## 2020-02-20 PROCEDURE — 93312 ECHO TRANSESOPHAGEAL: CPT

## 2020-02-20 PROCEDURE — G0463: CPT

## 2020-02-20 PROCEDURE — C1893: CPT

## 2020-02-20 PROCEDURE — 93005 ELECTROCARDIOGRAM TRACING: CPT

## 2020-02-20 PROCEDURE — 80048 BASIC METABOLIC PNL TOTAL CA: CPT

## 2020-02-20 PROCEDURE — 93662 INTRACARDIAC ECG (ICE): CPT

## 2020-02-20 PROCEDURE — 93656 COMPRE EP EVAL ABLTJ ATR FIB: CPT

## 2020-02-20 RX ORDER — ACETAMINOPHEN 500 MG
1000 TABLET ORAL ONCE
Refills: 0 | Status: COMPLETED | OUTPATIENT
Start: 2020-02-20 | End: 2020-02-20

## 2020-02-20 RX ORDER — FLECAINIDE ACETATE 50 MG
1 TABLET ORAL
Qty: 60 | Refills: 0
Start: 2020-02-20 | End: 2020-03-20

## 2020-02-20 RX ORDER — PANTOPRAZOLE SODIUM 20 MG/1
1 TABLET, DELAYED RELEASE ORAL
Qty: 30 | Refills: 0
Start: 2020-02-20 | End: 2020-03-20

## 2020-02-20 RX ADMIN — Medication 100 MILLIGRAM(S): at 05:36

## 2020-02-20 RX ADMIN — APIXABAN 5 MILLIGRAM(S): 2.5 TABLET, FILM COATED ORAL at 05:36

## 2020-02-20 RX ADMIN — PANTOPRAZOLE SODIUM 40 MILLIGRAM(S): 20 TABLET, DELAYED RELEASE ORAL at 05:36

## 2020-02-20 RX ADMIN — Medication 400 MILLIGRAM(S): at 11:31

## 2020-02-20 RX ADMIN — Medication 1000 MILLIGRAM(S): at 12:01

## 2020-02-20 RX ADMIN — Medication 1: at 11:33

## 2020-02-20 RX ADMIN — Medication 1: at 07:46

## 2020-02-20 RX ADMIN — SODIUM CHLORIDE 3 MILLILITER(S): 9 INJECTION INTRAMUSCULAR; INTRAVENOUS; SUBCUTANEOUS at 05:32

## 2020-02-20 RX ADMIN — SODIUM CHLORIDE 3 MILLILITER(S): 9 INJECTION INTRAMUSCULAR; INTRAVENOUS; SUBCUTANEOUS at 10:26

## 2020-02-20 NOTE — PROGRESS NOTE ADULT - SUBJECTIVE AND OBJECTIVE BOX
68 yo  male (denies implantable device) with PMHx of T2DM (last A1c 6.6 in 8/2019), HTN, HLD, Myeloma (no Tx, monitored by Dr. Allen) presents today for presurgical testing and KHLOE for scheduled Afib ablation on 2/19/2020. Recent EPS and ablation on 9/25/19- restarted on flecainide (did not ablate PVC concern for HB)- event monitor at that time showed suppression of PVC's.  Flecainide held and given event monitor- showed PVC's, atrial tachycardia and runs of symptomatic afib- now placed on Eliquis and dilt along with Flecainde. He continues to report intermittent left sided chest pressure and palpitations and SOB.     Echo on 6/18/2019 demonstrated normal left ventricular size and function except for mild LVH and mild systolic dysfunction with an ejection fraction of 55% and left atrial enlargement at 5.6 cm.    Card: Vinod Perry      Subjective/Observations: Pt is now s/p afib ablation via b/l groin sites. Pt tolerated procedure well. Pt and sites remain stable.       REVIEW OF SYSTEMS: All other review of systems is negative unless indicated above    MEDICATIONS  (STANDING):  apixaban 5 milliGRAM(s) Oral two times a day  dextrose 5%. 1000 milliLiter(s) (50 mL/Hr) IV Continuous <Continuous>  dextrose 50% Injectable 12.5 Gram(s) IV Push once  dextrose 50% Injectable 25 Gram(s) IV Push once  dextrose 50% Injectable 25 Gram(s) IV Push once  diltiazem    milliGRAM(s) Oral daily  flecainide 100 milliGRAM(s) Oral two times a day  insulin lispro (HumaLOG) corrective regimen sliding scale   SubCutaneous three times a day before meals  insulin lispro (HumaLOG) corrective regimen sliding scale   SubCutaneous at bedtime  loratadine 10 milliGRAM(s) Oral daily  losartan 25 milliGRAM(s) Oral daily  pantoprazole    Tablet 40 milliGRAM(s) Oral before breakfast  simvastatin 10 milliGRAM(s) Oral at bedtime  sodium chloride 0.9% lock flush 3 milliLiter(s) IV Push every 8 hours    MEDICATIONS  (PRN):  dextrose 40% Gel 15 Gram(s) Oral once PRN Blood Glucose LESS THAN 70 milliGRAM(s)/deciliter  glucagon  Injectable 1 milliGRAM(s) IntraMuscular once PRN Glucose LESS THAN 70 milligrams/deciliter      Allergies    metformin (Rash)    Intolerances      Vital Signs Last 24 Hrs  T(C): 36.6 (19 Feb 2020 20:45), Max: 36.7 (19 Feb 2020 14:10)  T(F): 97.9 (19 Feb 2020 20:45), Max: 98.1 (19 Feb 2020 14:10)  HR: 86 (19 Feb 2020 22:08) (86 - 103)  BP: 119/84 (19 Feb 2020 22:08) (106/76 - 140/90)  BP(mean): --  RR: 18 (19 Feb 2020 20:45) (18 - 18)  SpO2: 98% (19 Feb 2020 20:45) (95% - 98%)          I&O's Summary    19 Feb 2020 07:01  -  20 Feb 2020 00:32  --------------------------------------------------------  IN: 240 mL / OUT: 800 mL / NET: -560 mL      Weight (kg): 97.522 (02-19 @ 12:40)    PHYSICAL EXAM:  General: WN/WD NAD  Neurology: Awake, nonfocal, MARQUIS x 4  Respiratory: CTA B/L, No wheezing, rales, rhonchi  CV: RRR, S1S2, no murmurs, rubs or gallops  Abdominal: Soft, NT, ND +BS,   Extremities: b/l groin site- benign, No edema, + peripheral pulses  Skin: No Rashes, Hematoma, Ecchymosis  Psych: Oriented x 3, normal affect      LABS: All Labs Reviewed:                        14.8   6.91  )-----------( 180      ( 18 Feb 2020 13:11 )             46.2     18 Feb 2020 13:11    137    |  101    |  16     ----------------------------<  145    4.4     |  24     |  1.01     Ca    9.2        18 Feb 2020 13:11    TPro  7.3    /  Alb  4.5    /  TBili  0.6    /  DBili  x      /  AST  18     /  ALT  23     /  AlkPhos  91     18 Feb 2020 13:11    PT/INR - ( 18 Feb 2020 13:11 )   PT: 13.6 sec;   INR: 1.18 ratio         PTT - ( 18 Feb 2020 13:11 )  PTT:32.9 sec

## 2020-02-20 NOTE — DISCHARGE NOTE NURSING/CASE MANAGEMENT/SOCIAL WORK - PATIENT PORTAL LINK FT
You can access the FollowMyHealth Patient Portal offered by [facility] by registering at the following website: [email or web address]. By joining [facility]’s FollowMyHealth portal, you will also be able to view your health information using other applications (apps) compatible with our system.

## 2020-02-20 NOTE — PROGRESS NOTE ADULT - ASSESSMENT
70 yo  male (denies implantable device) with PMHx of T2DM (last A1c 6.6 in 8/2019), HTN, HLD, Myeloma (no Tx, monitored by Dr. Allen) presents today for presurgical testing and KHLOE for scheduled Afib ablation on 2/19/2020. Recent EPS and ablation on 9/25/19- restarted on flecainide (did not ablate PVC concern for HB)- event monitor at that time showed suppression of PVC's.  Flecainide held and given event monitor- showed PVC's, atrial tachycardia and runs of symptomatic afib- now placed on Eliquis and dilt along with Flecainde. He continues to report intermittent left sided chest pressure and palpitations and SOB.     Echo on 6/18/2019 demonstrated normal left ventricular size and function except for mild LVH and mild systolic dysfunction with an ejection fraction of 55% and left atrial enlargement at 5.6 cm. Pt is now s/p afib ablation via b/l groin sites. Pt tolerated procedure well, pt and sites remain stable.

## 2020-02-24 ENCOUNTER — APPOINTMENT (OUTPATIENT)
Dept: ELECTROPHYSIOLOGY | Facility: CLINIC | Age: 70
End: 2020-02-24

## 2020-03-02 ENCOUNTER — APPOINTMENT (OUTPATIENT)
Dept: ORTHOPEDIC SURGERY | Facility: CLINIC | Age: 70
End: 2020-03-02
Payer: MEDICARE

## 2020-03-02 VITALS
WEIGHT: 220 LBS | HEIGHT: 65 IN | SYSTOLIC BLOOD PRESSURE: 124 MMHG | BODY MASS INDEX: 36.65 KG/M2 | HEART RATE: 60 BPM | DIASTOLIC BLOOD PRESSURE: 75 MMHG

## 2020-03-02 DIAGNOSIS — R26.9 UNSPECIFIED ABNORMALITIES OF GAIT AND MOBILITY: ICD-10-CM

## 2020-03-02 DIAGNOSIS — M51.34 OTHER INTERVERTEBRAL DISC DEGENERATION, THORACIC REGION: ICD-10-CM

## 2020-03-02 DIAGNOSIS — M50.90 CERVICAL DISC DISORDER, UNSPECIFIED, UNSPECIFIED CERVICAL REGION: ICD-10-CM

## 2020-03-02 PROCEDURE — 99214 OFFICE O/P EST MOD 30 MIN: CPT

## 2020-03-05 ENCOUNTER — APPOINTMENT (OUTPATIENT)
Dept: SLEEP CENTER | Facility: CLINIC | Age: 70
End: 2020-03-05
Payer: MEDICARE

## 2020-03-05 ENCOUNTER — OUTPATIENT (OUTPATIENT)
Dept: OUTPATIENT SERVICES | Facility: HOSPITAL | Age: 70
LOS: 1 days | End: 2020-03-05
Payer: MEDICARE

## 2020-03-05 DIAGNOSIS — Z96.651 PRESENCE OF RIGHT ARTIFICIAL KNEE JOINT: Chronic | ICD-10-CM

## 2020-03-05 DIAGNOSIS — Z53.8 PROCEDURE AND TREATMENT NOT CARRIED OUT FOR OTHER REASONS: Chronic | ICD-10-CM

## 2020-03-05 DIAGNOSIS — G56.00 CARPAL TUNNEL SYNDROME, UNSPECIFIED UPPER LIMB: Chronic | ICD-10-CM

## 2020-03-05 PROCEDURE — 95806 SLEEP STUDY UNATT&RESP EFFT: CPT

## 2020-03-05 PROCEDURE — 95806 SLEEP STUDY UNATT&RESP EFFT: CPT | Mod: 26

## 2020-03-12 DIAGNOSIS — G47.33 OBSTRUCTIVE SLEEP APNEA (ADULT) (PEDIATRIC): ICD-10-CM

## 2020-03-23 ENCOUNTER — APPOINTMENT (OUTPATIENT)
Dept: PULMONOLOGY | Facility: CLINIC | Age: 70
End: 2020-03-23

## 2020-04-07 ENCOUNTER — APPOINTMENT (OUTPATIENT)
Dept: ELECTROPHYSIOLOGY | Facility: CLINIC | Age: 70
End: 2020-04-07
Payer: MEDICARE

## 2020-04-07 PROCEDURE — 99215 OFFICE O/P EST HI 40 MIN: CPT | Mod: 95

## 2020-04-07 NOTE — DISCUSSION/SUMMARY
[FreeTextEntry1] : In summary the patient is a 70-year-old gentleman with a history of prior frequent PVCs status post ablation. He was having episodes of atrial fibrillation despite flecainide and underwent an atrial fibrillation ablation. He has had some symptoms in follow-up.\par \par I do think his sleep apnea is the main cause for his fatigue and is contributing to his AF. I have reached out to Dr. Gorman to see if there is a way to start treatment for his sleep apnea as an out patient. He will continue his current medications and we have sent him a biotFlicstart monitor

## 2020-04-07 NOTE — HISTORY OF PRESENT ILLNESS
[FreeTextEntry1] : I had the pleasure of seeing your patient Adalberto Scanlon today in arrhythmia clinic of [facility]. As you well know the patient is a delightful 70 year old gentleman with a history of hypertension, hyperlipidemia and diabetes. The patient was found to have an irregular pulse and had frequent ventricular ectopy found. He underwent a cardiac workup. An echocardiogram on June 18, 2019 demonstrated normal left ventricular size and function except for mild LVH and mild systolic dysfunction with an ejection fraction of 55% and left atrial enlargement at 5.6 cm. A nuclear stress test in February of 2017 showed no evidence of ischemia however there was an ejection fraction of 45% seen. The patient underwent a cardiac monitor. He had 44,000 PVCs which accounted for 35% of his beats. He has no symptoms with it he is ectopy. \par \par He underwent an EPS and ablation on September 25, 2019 of an anterolateral RVOT PVC, which was the predominant PVC during that case. A second PVC was also noted during the study which had been seen clinically as well on prior EKGs. The second PVC was mapped to the left septum and found to be parahisian. A decision was made not to ablate that PVC given the risk of heart block. He was subsequently re-started on flecainide and a event monitor showed almost complete suppression of his PVCs. More recently, his flecainide was held and a subsequent event monitor showed 4776 PVCs (2.1% burden) over 24 hours and short runs of atrial tachycardia. The patient continued off AAs and called with symptoms of fatigue and palpitations. He was found to have runs of symptomatic atrial fibrillation and was placed on Eliquis, Flecainde and Diltiazem. He continued to have palpitations and some chest pressure and come back in today.\par \par The patient underwent an atrial fibrillation ablation on 2/10/20. this included a PVI and CTI ablation. He made an unremarkable recovery and remained on Eliquis, Flecainide and Diltiazem. I had the patient undergo a sleep study and he was found to have severe sleep apnea. Unfortunately his follow-up appointment was cancelled due to COVID. We had a follow-up Telehealth today with the patient and his wife. \par \par Overall Mir is doing well. He has lost about 10-12 pounds. He does complain of being tired all the time. He occasionally has had episodes of slight chest tightness that last seconds. About 2 weeks ago when he was walking in the park. He felt his heart racing and felt slightly lightheaded. This lasted for 15-20 min. He saw Dr. Perry and reportedly his ECG was OK. He has been taking his BP which has run 134-108/70-82.

## 2020-05-04 ENCOUNTER — APPOINTMENT (OUTPATIENT)
Dept: ELECTROPHYSIOLOGY | Facility: CLINIC | Age: 70
End: 2020-05-04

## 2020-05-14 ENCOUNTER — RX RENEWAL (OUTPATIENT)
Age: 70
End: 2020-05-14

## 2020-06-01 ENCOUNTER — FORM ENCOUNTER (OUTPATIENT)
Age: 70
End: 2020-06-01

## 2020-06-03 ENCOUNTER — APPOINTMENT (OUTPATIENT)
Dept: ELECTROPHYSIOLOGY | Facility: CLINIC | Age: 70
End: 2020-06-03
Payer: MEDICARE

## 2020-06-10 ENCOUNTER — APPOINTMENT (OUTPATIENT)
Dept: ORTHOPEDIC SURGERY | Facility: CLINIC | Age: 70
End: 2020-06-10
Payer: MEDICARE

## 2020-06-10 VITALS — WEIGHT: 220 LBS | HEIGHT: 65 IN | BODY MASS INDEX: 36.65 KG/M2 | TEMPERATURE: 97.5 F

## 2020-06-10 PROCEDURE — 93272 ECG/REVIEW INTERPRET ONLY: CPT

## 2020-06-10 PROCEDURE — 99213 OFFICE O/P EST LOW 20 MIN: CPT

## 2020-06-10 RX ORDER — FLECAINIDE ACETATE 100 MG/1
100 TABLET ORAL
Qty: 180 | Refills: 3 | Status: DISCONTINUED | COMMUNITY
Start: 2019-12-23 | End: 2020-06-10

## 2020-06-10 RX ORDER — FLECAINIDE ACETATE 100 MG/1
100 TABLET ORAL
Qty: 180 | Refills: 2 | Status: DISCONTINUED | COMMUNITY
Start: 2019-07-20 | End: 2020-06-10

## 2020-06-15 ENCOUNTER — NON-APPOINTMENT (OUTPATIENT)
Age: 70
End: 2020-06-15

## 2020-06-15 ENCOUNTER — APPOINTMENT (OUTPATIENT)
Dept: ELECTROPHYSIOLOGY | Facility: CLINIC | Age: 70
End: 2020-06-15
Payer: MEDICARE

## 2020-06-15 VITALS
DIASTOLIC BLOOD PRESSURE: 68 MMHG | WEIGHT: 220 LBS | SYSTOLIC BLOOD PRESSURE: 119 MMHG | HEART RATE: 76 BPM | BODY MASS INDEX: 36.65 KG/M2 | OXYGEN SATURATION: 96 % | HEIGHT: 65 IN

## 2020-06-15 PROCEDURE — 93000 ELECTROCARDIOGRAM COMPLETE: CPT

## 2020-06-15 PROCEDURE — 99213 OFFICE O/P EST LOW 20 MIN: CPT

## 2020-06-15 RX ORDER — IBUPROFEN 600 MG/1
600 TABLET, FILM COATED ORAL
Qty: 60 | Refills: 2 | Status: DISCONTINUED | COMMUNITY
Start: 2019-05-15 | End: 2020-06-15

## 2020-06-19 NOTE — HISTORY OF PRESENT ILLNESS
[FreeTextEntry1] : I had the pleasure of seeing your patient Adalberto Scanlon today in arrhythmia clinic of [facility]. As you well know the patient is a delightful 70 year old gentleman with a history of hypertension, hyperlipidemia and diabetes. The patient was found to have an irregular pulse and had frequent ventricular ectopy found. He underwent a cardiac workup. An echocardiogram on June 18, 2019 demonstrated normal left ventricular size and function except for mild LVH and mild systolic dysfunction with an ejection fraction of 55% and left atrial enlargement at 5.6 cm. A nuclear stress test in February of 2017 showed no evidence of ischemia however there was an ejection fraction of 45% seen. The patient underwent a cardiac monitor. He had 44,000 PVCs which accounted for 35% of his beats. He has no symptoms with his ectopic beats. \par \par He underwent an EPS and ablation on September 25, 2019 of an anterolateral RVOT PVC, which was the predominant PVC during that case. A second PVC was also noted during the study which had been seen clinically as well on prior EKGs. The second PVC was mapped to the left septum and found to be parahisian. A decision was made not to ablate that PVC given the risk of heart block. He was subsequently re-started on flecainide and a event monitor showed almost complete suppression of his PVCs. His flecainide was held and a subsequent event monitor showed 4776 PVCs (2.1% burden) over 24 hours and short runs of atrial tachycardia. The patient continued off AAs and called with symptoms of fatigue and palpitations. He was found to have runs of symptomatic atrial fibrillation and was placed on Eliquis, Flecainde and Diltiazem. \par \par The patient underwent an atrial fibrillation ablation on 2/10/20. this included a PVI and CTI ablation. He made an unremarkable recovery. His flecainide was discontinued but he remained on Eliquis and Diltiazem. I had the patient undergo a sleep study with Dr. Gorman and he was found to have severe sleep apnea. He is currently on CPAP. \par \par The patient reports that since he started using CPAP, he's been sleeping well. He has more energy now and has been going for a long walk, which he tolerates well. His tiredness has improved. He has lost about 20 pounds and has been modifying his diet. His blood pressure today was 119/68 and pulse was 76 and regular. EKG revealed normal sinus rhythm. His recent event monitor showed improvement in his PVC (1%) and PACs (1%) burden and no atrial fibrillation. \par \par

## 2020-06-19 NOTE — DISCUSSION/SUMMARY
[FreeTextEntry1] : In summary, the patient is a 70 year old gentleman with a prior history of frequent PVCs status post ablation. He was having episodes of atrial fibrillation despite flecainide and underwent atrial fibrillation ablation. He is doing very well with sleep apnea treatment with improvement in PVCs and APCs burden. After a discussion, the patient is doing well and will continue with his current therapy and follow up in six months.

## 2020-06-24 ENCOUNTER — OUTPATIENT (OUTPATIENT)
Dept: OUTPATIENT SERVICES | Facility: HOSPITAL | Age: 70
LOS: 1 days | Discharge: HOME | End: 2020-06-24

## 2020-06-24 DIAGNOSIS — G56.00 CARPAL TUNNEL SYNDROME, UNSPECIFIED UPPER LIMB: Chronic | ICD-10-CM

## 2020-06-24 DIAGNOSIS — Z96.651 PRESENCE OF RIGHT ARTIFICIAL KNEE JOINT: Chronic | ICD-10-CM

## 2020-06-24 DIAGNOSIS — D47.2 MONOCLONAL GAMMOPATHY: ICD-10-CM

## 2020-06-24 DIAGNOSIS — Z53.8 PROCEDURE AND TREATMENT NOT CARRIED OUT FOR OTHER REASONS: Chronic | ICD-10-CM

## 2020-06-30 ENCOUNTER — RESULT REVIEW (OUTPATIENT)
Age: 70
End: 2020-06-30

## 2020-06-30 ENCOUNTER — FORM ENCOUNTER (OUTPATIENT)
Age: 70
End: 2020-06-30

## 2020-06-30 ENCOUNTER — APPOINTMENT (OUTPATIENT)
Dept: HEMATOLOGY ONCOLOGY | Facility: CLINIC | Age: 70
End: 2020-06-30
Payer: MEDICARE

## 2020-06-30 VITALS
RESPIRATION RATE: 16 BRPM | BODY MASS INDEX: 35 KG/M2 | SYSTOLIC BLOOD PRESSURE: 138 MMHG | TEMPERATURE: 99 F | WEIGHT: 210.32 LBS | OXYGEN SATURATION: 98 % | HEART RATE: 82 BPM | DIASTOLIC BLOOD PRESSURE: 70 MMHG

## 2020-06-30 DIAGNOSIS — E78.5 HYPERLIPIDEMIA, UNSPECIFIED: ICD-10-CM

## 2020-06-30 LAB
ALBUMIN MFR SERPL ELPH: 61.3 %
ALBUMIN SERPL ELPH-MCNC: 4.1 G/DL
ALBUMIN SERPL-MCNC: 4 G/DL
ALBUMIN/GLOB SERPL: 1.6 RATIO
ALP BLD-CCNC: 102 U/L
ALPHA1 GLOB MFR SERPL ELPH: 4 %
ALPHA1 GLOB SERPL ELPH-MCNC: 0.3 G/DL
ALPHA2 GLOB MFR SERPL ELPH: 12 %
ALPHA2 GLOB SERPL ELPH-MCNC: 0.8 G/DL
ALT SERPL-CCNC: 18 U/L
ANION GAP SERPL CALC-SCNC: 13 MMOL/L
AST SERPL-CCNC: 16 U/L
B-GLOBULIN MFR SERPL ELPH: 9.7 %
B-GLOBULIN SERPL ELPH-MCNC: 0.6 G/DL
B2 MICROGLOB SERPL-MCNC: 1.7 MG/L
B2 MICROGLOB SERPL-MCNC: 1.7 MG/L
BASOPHILS # BLD AUTO: 0.05 K/UL — SIGNIFICANT CHANGE UP (ref 0–0.2)
BASOPHILS NFR BLD AUTO: 0.9 % — SIGNIFICANT CHANGE UP (ref 0–2)
BILIRUB SERPL-MCNC: 0.3 MG/DL
BUN SERPL-MCNC: 23 MG/DL
CALCIUM SERPL-MCNC: 9 MG/DL
CHLORIDE SERPL-SCNC: 105 MMOL/L
CO2 SERPL-SCNC: 22 MMOL/L
CREAT SERPL-MCNC: 0.98 MG/DL
DEPRECATED KAPPA LC FREE/LAMBDA SER: 6.13 RATIO
DEPRECATED KAPPA LC FREE/LAMBDA SER: 6.13 RATIO
EOSINOPHIL # BLD AUTO: 0.14 K/UL — SIGNIFICANT CHANGE UP (ref 0–0.5)
EOSINOPHIL NFR BLD AUTO: 2.5 % — SIGNIFICANT CHANGE UP (ref 0–6)
GAMMA GLOB FLD ELPH-MCNC: 0.8 G/DL
GAMMA GLOB MFR SERPL ELPH: 13 %
GLUCOSE SERPL-MCNC: 159 MG/DL
HCT VFR BLD CALC: 42.7 % — SIGNIFICANT CHANGE UP (ref 39–50)
HGB BLD-MCNC: 14 G/DL — SIGNIFICANT CHANGE UP (ref 13–17)
IGA SER QL IEP: 87 MG/DL
IGG SER QL IEP: 949 MG/DL
IGM SER QL IEP: 41 MG/DL
IMM GRANULOCYTES NFR BLD AUTO: 0.4 % — SIGNIFICANT CHANGE UP (ref 0–1.5)
INTERPRETATION SERPL IEP-IMP: NORMAL
KAPPA LC CSF-MCNC: 1 MG/DL
KAPPA LC CSF-MCNC: 1 MG/DL
KAPPA LC SERPL-MCNC: 6.13 MG/DL
KAPPA LC SERPL-MCNC: 6.13 MG/DL
LDH SERPL-CCNC: 184 U/L
LYMPHOCYTES # BLD AUTO: 1.19 K/UL — SIGNIFICANT CHANGE UP (ref 1–3.3)
LYMPHOCYTES # BLD AUTO: 20.9 % — SIGNIFICANT CHANGE UP (ref 13–44)
M PROTEIN MFR SERPL ELPH: 4.8 %
M PROTEIN SPEC IFE-MCNC: NORMAL
MCHC RBC-ENTMCNC: 31 PG — SIGNIFICANT CHANGE UP (ref 27–34)
MCHC RBC-ENTMCNC: 32.8 GM/DL — SIGNIFICANT CHANGE UP (ref 32–36)
MCV RBC AUTO: 94.7 FL — SIGNIFICANT CHANGE UP (ref 80–100)
MONOCLON BAND OBS SERPL: 0.3 G/DL
MONOCYTES # BLD AUTO: 0.51 K/UL — SIGNIFICANT CHANGE UP (ref 0–0.9)
MONOCYTES NFR BLD AUTO: 9 % — SIGNIFICANT CHANGE UP (ref 2–14)
NEUTROPHILS # BLD AUTO: 3.78 K/UL — SIGNIFICANT CHANGE UP (ref 1.8–7.4)
NEUTROPHILS NFR BLD AUTO: 66.3 % — SIGNIFICANT CHANGE UP (ref 43–77)
NRBC # BLD: 0 /100 WBCS — SIGNIFICANT CHANGE UP (ref 0–0)
PLATELET # BLD AUTO: 187 K/UL — SIGNIFICANT CHANGE UP (ref 150–400)
POTASSIUM SERPL-SCNC: 4.7 MMOL/L
PROT SERPL-MCNC: 6.5 G/DL
RBC # BLD: 4.51 M/UL — SIGNIFICANT CHANGE UP (ref 4.2–5.8)
RBC # FLD: 13 % — SIGNIFICANT CHANGE UP (ref 10.3–14.5)
SODIUM SERPL-SCNC: 140 MMOL/L
WBC # BLD: 5.69 K/UL — SIGNIFICANT CHANGE UP (ref 3.8–10.5)
WBC # FLD AUTO: 5.69 K/UL — SIGNIFICANT CHANGE UP (ref 3.8–10.5)

## 2020-06-30 PROCEDURE — 99214 OFFICE O/P EST MOD 30 MIN: CPT

## 2020-07-03 LAB
ALBUMIN MFR SERPL ELPH: 62.9 %
ALBUMIN SERPL ELPH-MCNC: 4.8 G/DL
ALBUMIN SERPL-MCNC: 4.3 G/DL
ALBUMIN/GLOB SERPL: 1.7 RATIO
ALP BLD-CCNC: 97 U/L
ALPHA1 GLOB MFR SERPL ELPH: 3.7 %
ALPHA1 GLOB SERPL ELPH-MCNC: 0.3 G/DL
ALPHA2 GLOB MFR SERPL ELPH: 11.7 %
ALPHA2 GLOB SERPL ELPH-MCNC: 0.8 G/DL
ALT SERPL-CCNC: 18 U/L
ANION GAP SERPL CALC-SCNC: 19 MMOL/L
AST SERPL-CCNC: 19 U/L
B-GLOBULIN MFR SERPL ELPH: 8.9 %
B-GLOBULIN SERPL ELPH-MCNC: 0.6 G/DL
BILIRUB SERPL-MCNC: 0.5 MG/DL
BUN SERPL-MCNC: 18 MG/DL
CALCIUM SERPL-MCNC: 9.2 MG/DL
CHLORIDE SERPL-SCNC: 103 MMOL/L
CO2 SERPL-SCNC: 19 MMOL/L
CREAT SERPL-MCNC: 1.03 MG/DL
DEPRECATED KAPPA LC FREE/LAMBDA SER: 7.11 RATIO
GAMMA GLOB FLD ELPH-MCNC: 0.9 G/DL
GAMMA GLOB MFR SERPL ELPH: 12.8 %
GLUCOSE SERPL-MCNC: 113 MG/DL
IGA SER QL IEP: 71 MG/DL
IGG SER QL IEP: 820 MG/DL
IGM SER QL IEP: 40 MG/DL
INTERPRETATION SERPL IEP-IMP: NORMAL
KAPPA LC CSF-MCNC: 0.63 MG/DL
KAPPA LC SERPL-MCNC: 4.48 MG/DL
LDH SERPL-CCNC: 210 U/L
M PROTEIN MFR SERPL ELPH: 5.8 %
M PROTEIN SPEC IFE-MCNC: NORMAL
MONOCLON BAND OBS SERPL: 0.4 G/DL
POTASSIUM SERPL-SCNC: 4.4 MMOL/L
PROT SERPL-MCNC: 6.8 G/DL
SODIUM SERPL-SCNC: 141 MMOL/L

## 2020-07-09 ENCOUNTER — APPOINTMENT (OUTPATIENT)
Dept: PULMONOLOGY | Facility: CLINIC | Age: 70
End: 2020-07-09
Payer: MEDICARE

## 2020-07-09 ENCOUNTER — APPOINTMENT (OUTPATIENT)
Dept: PULMONOLOGY | Facility: CLINIC | Age: 70
End: 2020-07-09

## 2020-07-09 VITALS
DIASTOLIC BLOOD PRESSURE: 81 MMHG | SYSTOLIC BLOOD PRESSURE: 135 MMHG | OXYGEN SATURATION: 96 % | RESPIRATION RATE: 17 BRPM | HEART RATE: 80 BPM | BODY MASS INDEX: 34.68 KG/M2 | TEMPERATURE: 98.1 F | WEIGHT: 208.38 LBS

## 2020-07-09 PROCEDURE — 99203 OFFICE O/P NEW LOW 30 MIN: CPT | Mod: GC

## 2020-07-09 NOTE — PHYSICAL EXAM
[General Appearance - Well Developed] : well developed [General Appearance - Well Nourished] : well nourished [Normal Appearance] : normal appearance [Normal Conjunctiva] : the conjunctiva exhibited no abnormalities [Neck Appearance] : the appearance of the neck was normal [Heart Rate And Rhythm] : heart rate was normal and rhythm regular [Heart Sounds] : normal S1 and S2 [Auscultation Breath Sounds / Voice Sounds] : lungs were clear to auscultation bilaterally [Exaggerated Use Of Accessory Muscles For Inspiration] : no accessory muscle use [Abnormal Walk] : normal gait [Nail Clubbing] : no clubbing of the fingernails [Cyanosis, Localized] : no localized cyanosis [Skin Turgor] : normal skin turgor [] : no rash [Motor Exam] : the motor exam was normal [No Focal Deficits] : no focal deficits [IV] : IV [Oriented To Time, Place, And Person] : oriented to person, place, and time [Affect] : the affect was normal [Mood] : the mood was normal

## 2020-07-12 LAB
DEPRECATED KAPPA LC FREE/LAMBDA SER: 7.11 RATIO
KAPPA LC CSF-MCNC: 0.63 MG/DL
KAPPA LC SERPL-MCNC: 4.48 MG/DL

## 2020-07-12 NOTE — ASSESSMENT
[Palliative] : Goals of care discussed with patient: Palliative [Palliative Care Plan] : not applicable at this time [FreeTextEntry1] : MGUS - stable for several years ; he remains at higher risk on account of  abnormal quantitative free serum light chain ratio.  \par His disease has remained stable since he was first seen here over 6 years ago. No c/o suggestive of myeloma or NHL.\par Repeat CMP, LDH, quant free serum light chains and ratio, B2MG, SPEP, quant. immunoglobulins today\par CBC wnl today.\par Continue f/u of cardiac arrhythmia with Dr. Sewell\par  \par  \par RV 6 months.

## 2020-07-12 NOTE — HISTORY OF PRESENT ILLNESS
[Disease:__________________________] : Disease: [unfilled] [de-identified] : Adalberto Scanlon, was first seen at the [facility] on September 4, 2012.  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  He has some degree of unsteadiness in his gait but no falls.  Of note, he has a history of type II diabetes.\par \par He underwent a neurologic evaluation by Dr. Beatrice Ta.  An MRI of the LS spine from October 2011 showed moderate left parasagittal and femoral disc herniations at L3-L4 which were new compared to a prior study in 2005.  Compression of the left L3 nerve root was seen as well as mild compression of the left lateral aspect of the thecal sac.  Additional smaller areas of herniation were seen at L1-L2 and L4-L5. 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  The MRI was repeated on June 27, 2012 and this showed no significant changes. \par \par Immunoprotein studies were performed to further complete the neuropathy evaluation and a small M spike of 0.5 g/dl was seen along with an IgG monoclonal kappa light chain protein.  The vitamin B12 level was normal.  A Lyme titer was negative. \par \par The patient was then seen by Dr. Lauri Gilbert and a bone marrow showed trilineage maturation along with an IgG kappa plasmacytosis averaging less than 10% of the overall marrow cellularity.  The plasma cells expressed , CD20 and BCL1.  Rare paratrabecular lymphoid aggregates were seen made up of small lymphoid cells which consisted of a mixture of B and T cells and were negative for CD5 and CD10.  \par \par A skeletal survey showed no lytic or blastic lesions.\par \par 24 hour urine showed no light chain excretion.\par \par The chemistries demonstrated a normal creatinine and calcium levels as well as liver function.  A beta-2 microglobulin was normal.  The free kappa serum light chain ratio was abnormal (5.08, 0.26-1.65) with a mild elevation of the free kappa serum light chain to 36.1 mg/L.\par \par He has since had no evidence of progression of disease and has not needed treatment to date.\par \par  [de-identified] : mgus, kappa light chain [FreeTextEntry1] : no therapy to date [de-identified] : MGUS - M spike remains stable, kappa light chain and quant free light chain ratio in 5-6 range.  Stable with about 6 yrs observation - minimal increase in kappa free light chains. Creat wnl as well as Ca \par Remains afebrile, he has had no respiratory infections\par Low Back Pain -He continues to have low back pain, radiating to the left leg. This has been a chronic problem. He continues to see chiropractor Dr. Amaro which has helped. He is also implementing physical therapy for tx. 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 Gait better.\par CBC is wnl, with plt count normal  Renal function remains normal.\par Major recent complaints are related to cardiac arrhythmia,had a ablation in February doing better .\par PBCs have been largely eliminated with ablation done in 9/ 2019 . He remains in AF with rate regulated on Diltiazem, Flecainide.

## 2020-08-05 NOTE — HISTORY OF PRESENT ILLNESS
[Obstructive Sleep Apnea] : obstructive sleep apnea [ESS: ___] : ESS score [unfilled] [FreeTextEntry1] : 70M hx Afib s/p ablation (9/2019 and 2/2020), severe ERIK, HTN, DM, MGUS, who comes for follow up. He was send for HST by cardiology, which showed GLORIA 41/hr, T90 9.6% with joann 74%. APAP was ordered for him on 4/2020. Pt has DreamStation Auto CPAP. \par \par 5/4/2020 on APAP. Now used to it. Tolerating it well and feels much better. Having more energy. Prior to having PAP - heavy snoring, non-restorative, EDS, fatigue, and SOB. Actively trying to lose weight. \par \par Sleep schedule: 10-11PM to bedtime, < 10 min sleep latency, out of bed by 6-8AM, only wakes up once a night. Average gets about 7-8 hrs. [facility]\par \par Diagnostic HST 3/5/20: GLORIA 41.1, T90 9.6%, joann 74% - mostly obstructive, HR ranged from \par Echo 2/2020: LA 4.6, EF 60%, normal LV sys function, No WMA\par PAP Compliance: Days used > 4 hrs: 100%, Mean nightly usage 7hr 12 min, therapy AHI 1.7\par Currently using Dreamstation Auto CPAP 4-20 cmH2O, mean pressure 5.1, peak 8.2; mask - nasal mask [Daytime Somnolence] : no daytime somnolence [Snoring] : no snoring [Frequent Nocturnal Awakening] : no nocturnal awakening [Awakes Unrefreshed] : does not awake unrefreshed [Unintentional Sleep While Inactive] : no unintentional sleep while inactive [Unintentional Sleep while Active] : no unintentional sleep while active [Awakes with Headache] : no headache upon awakening [Recent  Weight Gain] : no recent weight gain [Awakening With Dry Mouth] : no dry mouth upon awakening [Hypersomnolence] : no hypersomnolence [DMS] : no DMS [DIS] : no DIS [Cataplexy] : no cataplexy

## 2020-08-05 NOTE — ASSESSMENT
[FreeTextEntry1] : 70M hx Afib s/p ablation (9/2019 and 2/2020), severe ERIK, HTN, DM, MGUS, who comes for follow up. He was send for HST by cardiology, which showed GLORIA 41/hr, T90 9.6% with joann 74%. APAP was ordered for him on 4/2020. Pt has DreamStation Auto CPAP - pressures 4/20 cmH2O. Tolerating it well and feels much better. Having more energy. Good compliance and uses it 100% of the time with average nightly use of 7hr 12 min, AHI 1.7. Prior to having PAP - heavy snoring, non-restorative, EDS, fatigue, and SOB. ESS 0. Actively trying to lose weight. Pt doing well. Uses [employer]. Will continue APAP therapy and monitor Afib.  The patient is benefiting from PAP therapy and will continue nightly use as prescribed. \par \par \par Kimberly Weber MD\par Sleep Fellow

## 2020-08-05 NOTE — REVIEW OF SYSTEMS
[Negative] : Psychiatric [Difficulty Initiating Sleep] : no difficulty falling asleep [Difficulty Maintaining Sleep] : no difficulty maintaining sleep [Unusual Sleep Behavior] : no unusual sleep behavior [Hypersomnolence] : not sleeping much more than usual

## 2020-08-20 NOTE — PATIENT PROFILE ADULT - ANY IMPAIRED UPPER EXTREMITY FUNCTION WITHIN A WEEK PRIOR TO ADMISSION RELATED TO?
SO of pt - Veronica Doug calls in looking for covid results on pt    Pt was tested last Saturday - at [place] walk-in     I cannot see result     Called HE side - [facility] MAY was able to find result     Negative - not detected 8/14     Coronavirus (COVID-19) Notification    Lab Result   Lab test 2019-nCoV rRt-PCR OR SARS-COV-2 PCR    Nasopharyngeal AND/OR Oropharyngeal swab is NEGATIVE for 2019-nCoV RNA [OR] SARS-COV-2 RNA (COVID-19) RNA    Your result was negative. This means that we didn't find the virus that causes COVID-19 in your sample. A test may show negative when you do actually have the virus. This can happen when the virus is in the early stages of infection, before you feel illness symptoms.    If you have symptoms   Stay home and away from others (self-isolate) until you meet ALL of the guidelines below:    You've had no fever--and no medicine that reduces fever--for 3 full days (72 hours). And      Your other symptoms have gotten better. For example, your cough or breathing has improved. And     At least 10 days have passed since your symptoms started.    During this time:    Stay home. Don't go to work, school or anywhere else.     Stay in your own room, including for meals. Use your own bathroom if you can.    Stay away from others in your home. No hugging, kissing or shaking hands. No visitors.    Clean  high touch  surfaces often (doorknobs, counters, handles, etc.). Use a household cleaning spray or wipes. You can find a full list on the EPA website at www.epa.gov/pesticide-registration/list-n-disinfectants-use-against-sars-cov-2.    Cover your mouth and nose with a mask, tissue or washcloth to avoid spreading germs.    Wash your hands and face often with soap and water.    Going back to work  Check with your employer for any guidelines to follow for going back to work.  You are sent a letter for your Employer which will serve as formal document notice that you, the employee, tested negative for  COVID-19, as of the testing date shown above.    If your symptoms worsen or other concerning symptoms, contact PCP, oncare or consider returning to Emergency Dept.    Where can I get more information?    [facility]: [email or web address]    Coronavirus Basics: [email or web address]/diseases/coronavirus/basics.html    [facility] Hotline ([phone])    {Name]  Siomne Cardenas RN / [facility]                    Reason for Disposition    [1] Caller requesting NON-URGENT health information AND [2] PCP's office is the best resource    Protocols used: INFORMATION ONLY CALL-A-AH       no

## 2020-09-16 ENCOUNTER — APPOINTMENT (OUTPATIENT)
Dept: ORTHOPEDIC SURGERY | Facility: CLINIC | Age: 70
End: 2020-09-16
Payer: MEDICARE

## 2020-09-16 VITALS — HEIGHT: 65 IN | WEIGHT: 208 LBS | TEMPERATURE: 97.7 F | BODY MASS INDEX: 34.66 KG/M2

## 2020-09-16 DIAGNOSIS — M54.16 RADICULOPATHY, LUMBAR REGION: ICD-10-CM

## 2020-09-16 PROCEDURE — 99214 OFFICE O/P EST MOD 30 MIN: CPT

## 2020-09-30 ENCOUNTER — APPOINTMENT (OUTPATIENT)
Dept: ORTHOPEDIC SURGERY | Facility: CLINIC | Age: 70
End: 2020-09-30
Payer: MEDICARE

## 2020-09-30 VITALS — BODY MASS INDEX: 34.66 KG/M2 | TEMPERATURE: 97.8 F | WEIGHT: 208 LBS | HEIGHT: 65 IN

## 2020-09-30 PROCEDURE — 99213 OFFICE O/P EST LOW 20 MIN: CPT

## 2020-12-12 ENCOUNTER — FORM ENCOUNTER (OUTPATIENT)
Age: 70
End: 2020-12-12

## 2020-12-16 ENCOUNTER — RX RENEWAL (OUTPATIENT)
Age: 70
End: 2020-12-16

## 2020-12-22 ENCOUNTER — OUTPATIENT (OUTPATIENT)
Dept: OUTPATIENT SERVICES | Facility: HOSPITAL | Age: 70
LOS: 1 days | Discharge: HOME | End: 2020-12-22

## 2020-12-22 DIAGNOSIS — Z53.8 PROCEDURE AND TREATMENT NOT CARRIED OUT FOR OTHER REASONS: Chronic | ICD-10-CM

## 2020-12-22 DIAGNOSIS — G56.00 CARPAL TUNNEL SYNDROME, UNSPECIFIED UPPER LIMB: Chronic | ICD-10-CM

## 2020-12-22 DIAGNOSIS — Z96.651 PRESENCE OF RIGHT ARTIFICIAL KNEE JOINT: Chronic | ICD-10-CM

## 2020-12-22 DIAGNOSIS — D47.2 MONOCLONAL GAMMOPATHY: ICD-10-CM

## 2020-12-29 ENCOUNTER — RESULT REVIEW (OUTPATIENT)
Age: 70
End: 2020-12-29

## 2020-12-29 ENCOUNTER — APPOINTMENT (OUTPATIENT)
Dept: HEMATOLOGY ONCOLOGY | Facility: CLINIC | Age: 70
End: 2020-12-29
Payer: MEDICARE

## 2020-12-29 VITALS
BODY MASS INDEX: 35.09 KG/M2 | HEART RATE: 78 BPM | WEIGHT: 213.16 LBS | HEIGHT: 65.43 IN | SYSTOLIC BLOOD PRESSURE: 120 MMHG | RESPIRATION RATE: 16 BRPM | TEMPERATURE: 97.7 F | DIASTOLIC BLOOD PRESSURE: 74 MMHG | OXYGEN SATURATION: 99 %

## 2020-12-29 LAB
BASOPHILS # BLD AUTO: 0.05 K/UL — SIGNIFICANT CHANGE UP (ref 0–0.2)
BASOPHILS NFR BLD AUTO: 1 % — SIGNIFICANT CHANGE UP (ref 0–2)
EOSINOPHIL # BLD AUTO: 0.21 K/UL — SIGNIFICANT CHANGE UP (ref 0–0.5)
EOSINOPHIL NFR BLD AUTO: 4.2 % — SIGNIFICANT CHANGE UP (ref 0–6)
HCT VFR BLD CALC: 44.4 % — SIGNIFICANT CHANGE UP (ref 39–50)
HGB BLD-MCNC: 14.3 G/DL — SIGNIFICANT CHANGE UP (ref 13–17)
IMM GRANULOCYTES NFR BLD AUTO: 0.4 % — SIGNIFICANT CHANGE UP (ref 0–1.5)
LYMPHOCYTES # BLD AUTO: 1.37 K/UL — SIGNIFICANT CHANGE UP (ref 1–3.3)
LYMPHOCYTES # BLD AUTO: 27.3 % — SIGNIFICANT CHANGE UP (ref 13–44)
MCHC RBC-ENTMCNC: 30.7 PG — SIGNIFICANT CHANGE UP (ref 27–34)
MCHC RBC-ENTMCNC: 32.2 G/DL — SIGNIFICANT CHANGE UP (ref 32–36)
MCV RBC AUTO: 95.3 FL — SIGNIFICANT CHANGE UP (ref 80–100)
MONOCYTES # BLD AUTO: 0.68 K/UL — SIGNIFICANT CHANGE UP (ref 0–0.9)
MONOCYTES NFR BLD AUTO: 13.6 % — SIGNIFICANT CHANGE UP (ref 2–14)
NEUTROPHILS # BLD AUTO: 2.68 K/UL — SIGNIFICANT CHANGE UP (ref 1.8–7.4)
NEUTROPHILS NFR BLD AUTO: 53.5 % — SIGNIFICANT CHANGE UP (ref 43–77)
NRBC # BLD: 0 /100 WBCS — SIGNIFICANT CHANGE UP (ref 0–0)
PLATELET # BLD AUTO: 177 K/UL — SIGNIFICANT CHANGE UP (ref 150–400)
RBC # BLD: 4.66 M/UL — SIGNIFICANT CHANGE UP (ref 4.2–5.8)
RBC # FLD: 13.1 % — SIGNIFICANT CHANGE UP (ref 10.3–14.5)
WBC # BLD: 5.01 K/UL — SIGNIFICANT CHANGE UP (ref 3.8–10.5)
WBC # FLD AUTO: 5.01 K/UL — SIGNIFICANT CHANGE UP (ref 3.8–10.5)

## 2020-12-29 PROCEDURE — 99213 OFFICE O/P EST LOW 20 MIN: CPT

## 2021-01-03 NOTE — REVIEW OF SYSTEMS
[Negative] : Allergic/Immunologic [Palpitations] : palpitations [Joint Pain] : joint pain [Joint Stiffness] : joint stiffness [Chest Pain] : no chest pain [Lower Ext Edema] : no lower extremity edema [FreeTextEntry5] : as above [FreeTextEntry9] : chronic LBP as above [de-identified] : PN related to DM, DJD

## 2021-01-03 NOTE — HISTORY OF PRESENT ILLNESS
[Disease:__________________________] : Disease: [unfilled] [de-identified] : Adalberto Scanlon, was first seen at the [facility] on September 4, 2012.  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  He has some degree of unsteadiness in his gait but no falls.  Of note, he has a history of type II diabetes.\par \par He underwent a neurologic evaluation by Dr. Beatrice Ta.  An MRI of the LS spine from October 2011 showed moderate left parasagittal and femoral disc herniations at L3-L4 which were new compared to a prior study in 2005.  Compression of the left L3 nerve root was seen as well as mild compression of the left lateral aspect of the thecal sac.  Additional smaller areas of herniation were seen at L1-L2 and L4-L5. 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  The MRI was repeated on June 27, 2012 and this showed no significant changes. \par \par Immunoprotein studies were performed to further complete the neuropathy evaluation and a small M spike of 0.5 g/dl was seen along with an IgG monoclonal kappa light chain protein.  The vitamin B12 level was normal.  A Lyme titer was negative. \par \par The patient was then seen by Dr. Lauri Gilbert and a bone marrow showed trilineage maturation along with an IgG kappa plasmacytosis averaging less than 10% of the overall marrow cellularity.  The plasma cells expressed , CD20 and BCL1.  Rare paratrabecular lymphoid aggregates were seen made up of small lymphoid cells which consisted of a mixture of B and T cells and were negative for CD5 and CD10.  \par \par A skeletal survey showed no lytic or blastic lesions.\par \par 24 hour urine showed no light chain excretion.\par \par The chemistries demonstrated a normal creatinine and calcium levels as well as liver function.  A beta-2 microglobulin was normal.  The free kappa serum light chain ratio was abnormal (5.08, 0.26-1.65) with a mild elevation of the free kappa serum light chain to 36.1 mg/L.\par \par He has since had no evidence of progression of disease and has not needed treatment to date.\par \par  [de-identified] : mgus, kappa light chain [FreeTextEntry1] : no therapy to date [de-identified] : MGUS - M spike remains stable, kappa light chain and quant free light chain ratio in 5-6 range.  Stable with about 8 yrs observation - no increase in kappa free light chains. \par Creat wnl as well as Ca \par M spike stable over time 0.4-0.6\par No resp infections\par Low Back Pain -He continues to have chronic low back pain, radiating to the left leg. \par Helped by chiropractor, PT.. 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 Last Hgb A1c per pt 6.6\par CBC is wnl, with plt count normal  \par Major recent complaints are related to cardiac arrhythmia\par PCs have been largely eliminated with ablations done in 9/2019 and 2/2020. . He remains in AF with rate regulated on Diltiazem, Flecainide.

## 2021-01-03 NOTE — ASSESSMENT
[Palliative] : Goals of care discussed with patient: Palliative [Palliative Care Plan] : not applicable at this time [FreeTextEntry1] : MGUS - stable for several years; he remains at higher risk on account of  abnormal quantitative free serum light chain ratio.  \par In diagnostic marrow from 9/2012 overall there was < 10% plasma cells by  staining.\par Cyclin D1 was (=) suggesting presence of t(11;14).  FISH not done in outside marrow.\par His disease has remained stable since he was first seen here over 8 years ago. No c/o suggestive of myeloma or NHL.\par Repeat CMP, LDH, quant free serum light chains and ratio, B2MG, SPEP, quant. immunoglobulins today\par CBC wnl today.\par Continue f/u of cardiac arrhythmia with Dr. Sewell\par cont mgmt of DM\par  \par RV 6 months.

## 2021-01-15 ENCOUNTER — RX RENEWAL (OUTPATIENT)
Age: 71
End: 2021-01-15

## 2021-01-25 ENCOUNTER — APPOINTMENT (OUTPATIENT)
Dept: ELECTROPHYSIOLOGY | Facility: CLINIC | Age: 71
End: 2021-01-25
Payer: MEDICARE

## 2021-01-25 ENCOUNTER — NON-APPOINTMENT (OUTPATIENT)
Age: 71
End: 2021-01-25

## 2021-01-25 VITALS
HEART RATE: 80 BPM | BODY MASS INDEX: 35.82 KG/M2 | SYSTOLIC BLOOD PRESSURE: 122 MMHG | HEIGHT: 65 IN | WEIGHT: 215 LBS | OXYGEN SATURATION: 99 % | DIASTOLIC BLOOD PRESSURE: 72 MMHG

## 2021-01-25 PROCEDURE — 99214 OFFICE O/P EST MOD 30 MIN: CPT

## 2021-01-25 PROCEDURE — 93000 ELECTROCARDIOGRAM COMPLETE: CPT

## 2021-01-25 NOTE — PHYSICAL EXAM
[General Appearance - Well Developed] : well developed [Normal Appearance] : normal appearance [General Appearance - Well Nourished] : well nourished [Normal Conjunctiva] : the conjunctiva exhibited no abnormalities [Normal Oral Mucosa] : normal oral mucosa [Normal Oropharynx] : normal oropharynx [Respiration, Rhythm And Depth] : normal respiratory rhythm and effort [Exaggerated Use Of Accessory Muscles For Inspiration] : no accessory muscle use [Auscultation Breath Sounds / Voice Sounds] : lungs were clear to auscultation bilaterally [Heart Rate And Rhythm] : heart rate and rhythm were normal [Heart Sounds] : normal S1 and S2 [Murmurs] : no murmurs present [Arterial Pulses Normal] : the arterial pulses were normal [Bowel Sounds] : normal bowel sounds [Abdomen Soft] : soft [Abdomen Tenderness] : non-tender [Abnormal Walk] : normal gait [Cyanosis, Localized] : no localized cyanosis [Nail Clubbing] : no clubbing of the fingernails [Skin Color & Pigmentation] : normal skin color and pigmentation [Skin Turgor] : normal skin turgor [] : no rash [Oriented To Time, Place, And Person] : oriented to person, place, and time [Impaired Insight] : insight and judgment were intact [No Anxiety] : not feeling anxious [FreeTextEntry1] : No JVD

## 2021-01-25 NOTE — DISCUSSION/SUMMARY
[FreeTextEntry1] : In summary, the patient is a 70-year-old gentleman with a prior history of frequent PVCs status post ablation. He was having episodes of atrial fibrillation despite flecainide and underwent atrial fibrillation ablation. Since, he has been doing very well. He has been compliant with his CPAP. His Biotel shows no AF and low burden of PACs. We will continue Eliquis and Dilt at this time. He can return to clinic in 6 months.

## 2021-01-25 NOTE — HISTORY OF PRESENT ILLNESS
[FreeTextEntry1] : I had the pleasure of seeing your patient Adalberto Scanlon today in arrhythmia clinic of [facility]. As you well know the patient is a delightful 70 year old gentleman with a history of hypertension, hyperlipidemia and diabetes. The patient was found to have an irregular pulse and had frequent ventricular ectopy found. He underwent a cardiac workup. An echocardiogram on June 18, 2019 demonstrated normal left ventricular size and function except for mild LVH and mild systolic dysfunction with an ejection fraction of 55% and left atrial enlargement at 5.6 cm. A nuclear stress test in February of 2017 showed no evidence of ischemia however there was an ejection fraction of 45% seen. The patient underwent a cardiac monitor. He had 44,000 PVCs which accounted for 35% of his beats. He has no symptoms with his ectopic beats. \par \par He underwent an EPS and ablation on September 25, 2019 of an anterolateral RVOT PVC, which was the predominant PVC during that case. A second PVC was also noted during the study which had been seen clinically as well on prior EKGs. The second PVC was mapped to the left septum and found to be parahisian. A decision was made not to ablate that PVC given the risk of heart block. He was subsequently re-started on flecainide and a event monitor showed almost complete suppression of his PVCs. His flecainide was held and a subsequent event monitor showed 4776 PVCs (2.1% burden) over 24 hours and short runs of atrial tachycardia. The patient continued off AAs and called with symptoms of fatigue and palpitations. He was found to have runs of symptomatic atrial fibrillation and was placed on Eliquis, Flecainde and Diltiazem. \par \par The patient underwent an atrial fibrillation ablation on 2/10/20. this included a PVI and CTI ablation. He made an unremarkable recovery. His flecainide was discontinued but he remained on Eliquis and Diltiazem. I had the patient undergo a sleep study with Dr. Gorman and he was found to have severe sleep apnea. He is currently on CPAP. \par \par Today in clinic he reports overall doing well. He was concerned about some palpitations in December 2020, so he had a 1 week Biotel monitor which showed no atrial fibrillation, <1% PVCs, and 2% PACs. His blood pressure today was 122/72 and pulse was 80 and regular. EKG revealed normal sinus rhythm. He states he only feels occasional extra beats and nothing like he had in the past.

## 2021-03-24 ENCOUNTER — APPOINTMENT (OUTPATIENT)
Dept: ORTHOPEDIC SURGERY | Facility: CLINIC | Age: 71
End: 2021-03-24
Payer: MEDICARE

## 2021-03-24 VITALS
HEART RATE: 80 BPM | DIASTOLIC BLOOD PRESSURE: 83 MMHG | SYSTOLIC BLOOD PRESSURE: 142 MMHG | WEIGHT: 210 LBS | BODY MASS INDEX: 34.99 KG/M2 | HEIGHT: 65 IN | TEMPERATURE: 97.2 F

## 2021-03-24 PROCEDURE — 99214 OFFICE O/P EST MOD 30 MIN: CPT

## 2021-03-24 NOTE — DISCUSSION/SUMMARY
[de-identified] : He is seen w/  Dr Amaro.  He will change the gabapentin to 300 mg twice a day instead of 600 mg at night.  He will followup in about a month.  if he isn't improved, then we will consider an EMG at that time.  New rx for gabapentin sent

## 2021-03-24 NOTE — REASON FOR VISIT
[Follow-Up Visit] : a follow-up visit for [Back Pain] : back pain [Radiculopathy] : radiculopathy [Spinal Stenosis] : spinal stenosis

## 2021-03-24 NOTE — PHYSICAL EXAM
[LE] : Sensory: Intact in bilateral lower extremities [2+] : right patella 2+ [0] : right ankle jerk 0 [1+] : left ankle jerk 1+ [Normal] : Oriented to person, place, and time, insight and judgement were intact and the affect was normal [de-identified] : He walks slightly bent forward. He is able to heel/toe walk.  He has full lumbar ROM [de-identified] : no xrays taken today

## 2021-03-24 NOTE — HISTORY OF PRESENT ILLNESS
[2] : an average pain level of 2/10 [Walking] : worsened by walking [Rest] : relieved by rest [de-identified] : Mr Scanlon is a 72 yo male who presents for followup of his low back pain.  He says after his second COVID vaccine in 2/24 he had increase in back pain that radiated to his foot.  He increased his gabapentin to 600 mg at night.  He takes it as needed.  his pain has gotten better since, but is only in his left buttock.  he can only walk 10 min at a time.  He works w/ a chiropractor and PT who told him to lessen his walking for a while.  Pain is rated 2/10 [Incontinence] : no incontinence [Urinary Ret.] : no urinary retention [de-identified] : gabapentin

## 2021-04-13 ENCOUNTER — RX RENEWAL (OUTPATIENT)
Age: 71
End: 2021-04-13

## 2021-04-28 ENCOUNTER — APPOINTMENT (OUTPATIENT)
Dept: ORTHOPEDIC SURGERY | Facility: CLINIC | Age: 71
End: 2021-04-28
Payer: MEDICARE

## 2021-04-28 VITALS
BODY MASS INDEX: 34.95 KG/M2 | HEART RATE: 86 BPM | DIASTOLIC BLOOD PRESSURE: 81 MMHG | WEIGHT: 210 LBS | TEMPERATURE: 97.2 F | SYSTOLIC BLOOD PRESSURE: 145 MMHG

## 2021-04-28 PROCEDURE — 99214 OFFICE O/P EST MOD 30 MIN: CPT

## 2021-06-09 ENCOUNTER — APPOINTMENT (OUTPATIENT)
Dept: ORTHOPEDIC SURGERY | Facility: CLINIC | Age: 71
End: 2021-06-09
Payer: MEDICARE

## 2021-06-09 VITALS
TEMPERATURE: 97.2 F | HEART RATE: 82 BPM | DIASTOLIC BLOOD PRESSURE: 75 MMHG | BODY MASS INDEX: 34.99 KG/M2 | WEIGHT: 210 LBS | SYSTOLIC BLOOD PRESSURE: 146 MMHG | HEIGHT: 65 IN

## 2021-06-09 PROCEDURE — 99213 OFFICE O/P EST LOW 20 MIN: CPT

## 2021-06-18 ENCOUNTER — RX RENEWAL (OUTPATIENT)
Age: 71
End: 2021-06-18

## 2021-06-25 ENCOUNTER — OUTPATIENT (OUTPATIENT)
Dept: OUTPATIENT SERVICES | Facility: HOSPITAL | Age: 71
LOS: 1 days | Discharge: HOME | End: 2021-06-25

## 2021-06-25 DIAGNOSIS — Z96.651 PRESENCE OF RIGHT ARTIFICIAL KNEE JOINT: Chronic | ICD-10-CM

## 2021-06-25 DIAGNOSIS — D47.2 MONOCLONAL GAMMOPATHY: ICD-10-CM

## 2021-06-25 DIAGNOSIS — G56.00 CARPAL TUNNEL SYNDROME, UNSPECIFIED UPPER LIMB: Chronic | ICD-10-CM

## 2021-06-25 DIAGNOSIS — Z53.8 PROCEDURE AND TREATMENT NOT CARRIED OUT FOR OTHER REASONS: Chronic | ICD-10-CM

## 2021-06-29 ENCOUNTER — RESULT REVIEW (OUTPATIENT)
Age: 71
End: 2021-06-29

## 2021-06-29 ENCOUNTER — APPOINTMENT (OUTPATIENT)
Dept: HEMATOLOGY ONCOLOGY | Facility: CLINIC | Age: 71
End: 2021-06-29
Payer: MEDICARE

## 2021-06-29 VITALS
HEIGHT: 66.42 IN | WEIGHT: 212.06 LBS | OXYGEN SATURATION: 96 % | TEMPERATURE: 97.5 F | DIASTOLIC BLOOD PRESSURE: 77 MMHG | RESPIRATION RATE: 16 BRPM | SYSTOLIC BLOOD PRESSURE: 128 MMHG | HEART RATE: 83 BPM | BODY MASS INDEX: 33.68 KG/M2

## 2021-06-29 DIAGNOSIS — G47.33 OBSTRUCTIVE SLEEP APNEA (ADULT) (PEDIATRIC): ICD-10-CM

## 2021-06-29 LAB
BASOPHILS # BLD AUTO: 0.06 K/UL — SIGNIFICANT CHANGE UP (ref 0–0.2)
BASOPHILS NFR BLD AUTO: 1.1 % — SIGNIFICANT CHANGE UP (ref 0–2)
EOSINOPHIL # BLD AUTO: 0.12 K/UL — SIGNIFICANT CHANGE UP (ref 0–0.5)
EOSINOPHIL NFR BLD AUTO: 2.2 % — SIGNIFICANT CHANGE UP (ref 0–6)
HCT VFR BLD CALC: 44.6 % — SIGNIFICANT CHANGE UP (ref 39–50)
HGB BLD-MCNC: 14.5 G/DL — SIGNIFICANT CHANGE UP (ref 13–17)
IMM GRANULOCYTES NFR BLD AUTO: 0.2 % — SIGNIFICANT CHANGE UP (ref 0–1.5)
LYMPHOCYTES # BLD AUTO: 1.25 K/UL — SIGNIFICANT CHANGE UP (ref 1–3.3)
LYMPHOCYTES # BLD AUTO: 23.3 % — SIGNIFICANT CHANGE UP (ref 13–44)
MCHC RBC-ENTMCNC: 30.8 PG — SIGNIFICANT CHANGE UP (ref 27–34)
MCHC RBC-ENTMCNC: 32.5 G/DL — SIGNIFICANT CHANGE UP (ref 32–36)
MCV RBC AUTO: 94.7 FL — SIGNIFICANT CHANGE UP (ref 80–100)
MONOCYTES # BLD AUTO: 0.54 K/UL — SIGNIFICANT CHANGE UP (ref 0–0.9)
MONOCYTES NFR BLD AUTO: 10.1 % — SIGNIFICANT CHANGE UP (ref 2–14)
NEUTROPHILS # BLD AUTO: 3.38 K/UL — SIGNIFICANT CHANGE UP (ref 1.8–7.4)
NEUTROPHILS NFR BLD AUTO: 63.1 % — SIGNIFICANT CHANGE UP (ref 43–77)
NRBC # BLD: 0 /100 WBCS — SIGNIFICANT CHANGE UP (ref 0–0)
PLATELET # BLD AUTO: 189 K/UL — SIGNIFICANT CHANGE UP (ref 150–400)
RBC # BLD: 4.71 M/UL — SIGNIFICANT CHANGE UP (ref 4.2–5.8)
RBC # FLD: 13 % — SIGNIFICANT CHANGE UP (ref 10.3–14.5)
WBC # BLD: 5.36 K/UL — SIGNIFICANT CHANGE UP (ref 3.8–10.5)
WBC # FLD AUTO: 5.36 K/UL — SIGNIFICANT CHANGE UP (ref 3.8–10.5)

## 2021-06-29 PROCEDURE — 99213 OFFICE O/P EST LOW 20 MIN: CPT

## 2021-06-29 RX ORDER — GABAPENTIN 300 MG/1
300 CAPSULE ORAL
Qty: 90 | Refills: 0 | Status: DISCONTINUED | COMMUNITY
Start: 2020-09-16 | End: 2021-06-29

## 2021-06-29 RX ORDER — GABAPENTIN 300 MG/1
300 CAPSULE ORAL TWICE DAILY
Qty: 120 | Refills: 1 | Status: DISCONTINUED | COMMUNITY
Start: 2021-03-24 | End: 2021-06-29

## 2021-06-29 NOTE — REVIEW OF SYSTEMS
[Palpitations] : palpitations [Joint Pain] : joint pain [Joint Stiffness] : joint stiffness [Negative] : Allergic/Immunologic [Chest Pain] : no chest pain [Lower Ext Edema] : no lower extremity edema [FreeTextEntry5] : as above [FreeTextEntry9] : chronic LBP as above [de-identified] : PN related to DM, DJD

## 2021-06-29 NOTE — PHYSICAL EXAM
[Restricted in physically strenuous activity but ambulatory and able to carry out work of a light or sedentary nature] : Status 1- Restricted in physically strenuous activity but ambulatory and able to carry out work of a light or sedentary nature, e.g., light house work, office work [Normal] : affect appropriate [de-identified] : irreg irreg

## 2021-06-29 NOTE — HISTORY OF PRESENT ILLNESS
[Disease:__________________________] : Disease: [unfilled] [de-identified] : Adalberto Scanlon, was first seen at the [facility] [place] on September 4, 2012.  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  He has some degree of unsteadiness in his gait but no falls.  Of note, he has a history of type II diabetes.\par \par He underwent a neurologic evaluation by Dr. Beatrice Ta.  An MRI of the LS spine from October 2011 showed moderate left parasagittal and femoral disc herniations at L3-L4 which were new compared to a prior study in 2005.  Compression of the left L3 nerve root was seen as well as mild compression of the left lateral aspect of the thecal sac.  Additional smaller areas of herniation were seen at L1-L2 and L4-L5. 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  The MRI was repeated on June 27, 2012 and this showed no significant changes. \par \par Immunoprotein studies were performed to further complete the neuropathy evaluation and a small M spike of 0.5 g/dl was seen along with an IgG monoclonal kappa light chain protein.  The vitamin B12 level was normal.  A Lyme titer was negative. \par \par The patient was then seen by Dr. Lauri Gilbert and a bone marrow showed trilineage maturation along with an IgG kappa plasmacytosis averaging less than 10% of the overall marrow cellularity.  The plasma cells expressed , CD20 and BCL1.  Rare paratrabecular lymphoid aggregates were seen made up of small lymphoid cells which consisted of a mixture of B and T cells and were negative for CD5 and CD10.  \par \par A skeletal survey showed no lytic or blastic lesions.\par \par 24 hour urine showed no light chain excretion.\par \par The chemistries demonstrated a normal creatinine and calcium levels as well as liver function.  A beta-2 microglobulin was normal.  The free kappa serum light chain ratio was abnormal (5.08, 0.26-1.65) with a mild elevation of the free kappa serum light chain to 36.1 mg/L.\par \par He has since had no evidence of progression of disease and has not needed treatment to date.\par \par  [de-identified] : mgus, kappa light chain [FreeTextEntry1] : no therapy to date [de-identified] : MGUS - M spike remains stable, kappa light chain and quant free light chain ratio in 5-6 range.  Stable with about 8 yrs observation - no increase in kappa free light chains. \par Creat wnl as well as Ca \par M spike stable over time 0.4-0.6; last visit 12/2020 it was 0.5\par No resp infections\par CBC results reviewed and d/w pt 189K\par WBC 5.36, Hgb 14.5, Plt 189K\par Low Back Pain -He continues to have chronic low back pain, radiating to the left leg. 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 Last Hgb A1c per pt < 7\par  \par PCs have been largely eliminated with ablations done in 9/2019 and 2/2020. .\par He remains in AF with rate regulated on Diltiazem\par Remains on eliquis

## 2021-06-29 NOTE — ASSESSMENT
[Palliative] : Goals of care discussed with patient: Palliative [Palliative Care Plan] : not applicable at this time [FreeTextEntry1] : MGUS - stable for several years; he remains at higher risk on account of  abnormal quantitative free serum light chain ratio.  \par In diagnostic marrow from 9/2012 overall there was < 10% plasma cells by  staining.\par Cyclin D1 was (=) suggesting presence of t(11;14).  FISH not done in outside marrow.\par His disease has remained stable since he was first seen here over 9 years ago. No c/o suggestive of myeloma or NHL.\par No new skeletal c/o - has chronic pain related to known DJD\par Renal fxn, Ca stable\par Repeat CMP, LDH, quant free serum light chains and ratio, B2MG, SPEP, quant. immunoglobulins today\par CBC wnl today.\par Continue f/u of cardiac arrhythmia with Dr. Sewell\par cont mgmt of DM\par  \par RV 6 months.

## 2021-07-19 ENCOUNTER — APPOINTMENT (OUTPATIENT)
Dept: ELECTROPHYSIOLOGY | Facility: CLINIC | Age: 71
End: 2021-07-19
Payer: MEDICARE

## 2021-07-19 ENCOUNTER — NON-APPOINTMENT (OUTPATIENT)
Age: 71
End: 2021-07-19

## 2021-07-19 VITALS
HEIGHT: 66 IN | DIASTOLIC BLOOD PRESSURE: 80 MMHG | HEART RATE: 77 BPM | SYSTOLIC BLOOD PRESSURE: 138 MMHG | WEIGHT: 210 LBS | BODY MASS INDEX: 33.75 KG/M2 | OXYGEN SATURATION: 97 %

## 2021-07-19 PROCEDURE — 99214 OFFICE O/P EST MOD 30 MIN: CPT

## 2021-07-19 PROCEDURE — 93000 ELECTROCARDIOGRAM COMPLETE: CPT

## 2021-07-19 NOTE — HISTORY OF PRESENT ILLNESS
[FreeTextEntry1] : I had the pleasure of seeing your patient Adalberto Scanlon today in arrhythmia clinic of [facility]. As you well know the patient is a delightful and anxious 71 year old gentleman with a history of hypertension, hyperlipidemia and diabetes. The patient was found to have an irregular pulse and had frequent ventricular ectopy found. He underwent a cardiac workup. An echocardiogram on June 18, 2019 demonstrated normal left ventricular size and function except for mild LVH and mild systolic dysfunction with an ejection fraction of 55% and left atrial enlargement at 5.6 cm. A nuclear stress test in February of 2017 showed no evidence of ischemia however there was an ejection fraction of 45% seen. The patient underwent a cardiac monitor. He had 44,000 PVCs which accounted for 35% of his beats. He has no symptoms with his ectopic beats. \par \par He underwent an EPS and ablation on September 25, 2019 of an anterolateral RVOT PVC, which was the predominant PVC during that case. A second PVC was also noted during the study which had been seen clinically as well on prior EKGs. The second PVC was mapped to the left septum and found to be parahisian. A decision was made not to ablate that PVC given the risk of heart block. He was subsequently re-started on flecainide and a event monitor showed almost complete suppression of his PVCs. His flecainide was held and a subsequent event monitor showed 4776 PVCs (2.1% burden) over 24 hours and short runs of atrial tachycardia. The patient continued off AAs and called with symptoms of fatigue and palpitations. He was found to have runs of symptomatic atrial fibrillation and was placed on Eliquis, Flecainde and Diltiazem. \par \par The patient underwent an atrial fibrillation ablation on 2/10/20. this included a PVI and CTI ablation. He made an unremarkable recovery. His flecainide was discontinued but he remained on Eliquis and Diltiazem. I had the patient undergo a sleep study with Dr. Gorman and he was found to have severe sleep apnea. He is currently on CPAP. \par \par Today in clinic he reports overall doing well. He reports 2 episodes of what he thinks was atrial fibrillation since he was last seen in January. one lasted 5 minutes and the other, 2 months ago lasted 45. He was "upset" at the time they occurred.  His blood pressure today was 138/80 and pulse was 77 and regular. EKG revealed normal sinus rhythm with occasional APCs and LVH.

## 2021-07-19 NOTE — DISCUSSION/SUMMARY
[FreeTextEntry1] : In summary, the patient is a 71-year-old gentleman with a prior history of frequent PVCs status post ablation. He was having episodes of atrial fibrillation despite flecainide and underwent atrial fibrillation ablation as well. Since, he has been doing very well. He has been compliant with his CPAP. He reports only one significant recurrent episode of AF.  We will continue Eliquis and Diltiazem at this time and will be seeing Dr. Gorman to explore options for treatment of his ERIK.

## 2021-08-18 ENCOUNTER — APPOINTMENT (OUTPATIENT)
Dept: ORTHOPEDIC SURGERY | Facility: CLINIC | Age: 71
End: 2021-08-18
Payer: MEDICARE

## 2021-08-18 ENCOUNTER — NON-APPOINTMENT (OUTPATIENT)
Age: 71
End: 2021-08-18

## 2021-08-18 VITALS — HEART RATE: 80 BPM | SYSTOLIC BLOOD PRESSURE: 141 MMHG | DIASTOLIC BLOOD PRESSURE: 82 MMHG

## 2021-08-18 DIAGNOSIS — M17.12 UNILATERAL PRIMARY OSTEOARTHRITIS, LEFT KNEE: ICD-10-CM

## 2021-08-18 PROCEDURE — 72050 X-RAY EXAM NECK SPINE 4/5VWS: CPT

## 2021-08-18 PROCEDURE — 99214 OFFICE O/P EST MOD 30 MIN: CPT

## 2021-08-18 PROCEDURE — 73562 X-RAY EXAM OF KNEE 3: CPT | Mod: LT

## 2021-08-18 RX ORDER — SIMVASTATIN 10 MG/1
10 TABLET, FILM COATED ORAL
Qty: 90 | Refills: 0 | Status: DISCONTINUED | COMMUNITY
Start: 2020-11-14 | End: 2021-08-18

## 2021-08-23 ENCOUNTER — APPOINTMENT (OUTPATIENT)
Dept: ORTHOPEDIC SURGERY | Facility: CLINIC | Age: 71
End: 2021-08-23
Payer: MEDICARE

## 2021-08-23 VITALS
WEIGHT: 210 LBS | BODY MASS INDEX: 33.75 KG/M2 | SYSTOLIC BLOOD PRESSURE: 150 MMHG | HEIGHT: 66 IN | HEART RATE: 82 BPM | DIASTOLIC BLOOD PRESSURE: 70 MMHG

## 2021-08-23 VITALS
BODY MASS INDEX: 23.46 KG/M2 | WEIGHT: 146 LBS | DIASTOLIC BLOOD PRESSURE: 99 MMHG | HEIGHT: 66 IN | SYSTOLIC BLOOD PRESSURE: 175 MMHG | HEART RATE: 82 BPM

## 2021-08-23 DIAGNOSIS — S43.402A UNSPECIFIED SPRAIN OF LEFT SHOULDER JOINT, INITIAL ENCOUNTER: ICD-10-CM

## 2021-08-23 PROCEDURE — 73030 X-RAY EXAM OF SHOULDER: CPT | Mod: LT

## 2021-08-23 PROCEDURE — 99214 OFFICE O/P EST MOD 30 MIN: CPT

## 2021-08-24 PROBLEM — S43.402A SPRAIN OF LEFT SHOULDER: Status: ACTIVE | Noted: 2021-08-24

## 2021-09-01 ENCOUNTER — APPOINTMENT (OUTPATIENT)
Dept: ORTHOPEDIC SURGERY | Facility: CLINIC | Age: 71
End: 2021-09-01
Payer: MEDICARE

## 2021-09-01 VITALS
WEIGHT: 210 LBS | BODY MASS INDEX: 33.75 KG/M2 | DIASTOLIC BLOOD PRESSURE: 85 MMHG | HEIGHT: 66 IN | SYSTOLIC BLOOD PRESSURE: 133 MMHG | HEART RATE: 80 BPM

## 2021-09-01 PROCEDURE — 99212 OFFICE O/P EST SF 10 MIN: CPT

## 2021-09-02 ENCOUNTER — APPOINTMENT (OUTPATIENT)
Dept: PULMONOLOGY | Facility: CLINIC | Age: 71
End: 2021-09-02
Payer: MEDICARE

## 2021-09-02 VITALS
TEMPERATURE: 97.6 F | BODY MASS INDEX: 34.55 KG/M2 | HEART RATE: 89 BPM | SYSTOLIC BLOOD PRESSURE: 132 MMHG | WEIGHT: 215 LBS | RESPIRATION RATE: 16 BRPM | DIASTOLIC BLOOD PRESSURE: 81 MMHG | HEIGHT: 66 IN

## 2021-09-02 PROCEDURE — 99213 OFFICE O/P EST LOW 20 MIN: CPT | Mod: GC

## 2021-09-02 NOTE — PHYSICAL EXAM
[General Appearance - Well Developed] : well developed [Normal Appearance] : normal appearance [General Appearance - Well Nourished] : well nourished [Normal Conjunctiva] : the conjunctiva exhibited no abnormalities [IV] : IV [Heart Sounds] : normal S1 and S2 [Exaggerated Use Of Accessory Muscles For Inspiration] : no accessory muscle use [Auscultation Breath Sounds / Voice Sounds] : lungs were clear to auscultation bilaterally [Abnormal Walk] : normal gait [Nail Clubbing] : no clubbing of the fingernails [Cyanosis, Localized] : no localized cyanosis [Skin Turgor] : normal skin turgor [Motor Exam] : the motor exam was normal [No Focal Deficits] : no focal deficits [Affect] : the affect was normal [Oriented To Time, Place, And Person] : oriented to person, place, and time [Mood] : the mood was normal [Neck Appearance] : the appearance of the neck was normal [] : the neck was supple [FreeTextEntry1] : irregularly irregular rhythm

## 2021-09-02 NOTE — HISTORY OF PRESENT ILLNESS
[Obstructive Sleep Apnea] : obstructive sleep apnea [FreeTextEntry1] : 71M hx Afib s/p ablation (9/2019 and 2/2020) on Eliquis, severe ERIK, HTN, DM, MGUS, presenting for follow up. Patient has been compliant with his APAP machine and prior sleep symptoms that he had (heavy snoring, non-restorative sleep, EDS, fatigue) continues to be under controlled. He is here to also discuss about alternative therapies for ERIK given the Rapp recall (pt. uses Dreamstation 1)\par \par Sleep schedule: 10-11PM to bedtime, < 10 min sleep latency, out of bed by 6-8AM, only wakes up once a night. Average gets about 7-8 hrs. [facility]\par \par Diagnostic HST 3/5/20: GLORIA 41.1, T90 9.6%, joann 74% - mostly obstructive, HR ranged from \par Echo 2/2020: LA 4.6, EF 60%, normal LV sys function, No WMA\par PAP Compliance: Days used > 4 hrs: 100%, Mean nightly usage 6hr 24 min, therapy AHI 1.6\par Currently using Dreamstation Auto CPAP 4-20 cmH2O, mean pressure 4.8, peak 6.9; mask - nasal mask [Snoring] : no snoring [Frequent Nocturnal Awakening] : no nocturnal awakening [Unintentional Sleep while Active] : no unintentional sleep while active [Unintentional Sleep While Inactive] : no unintentional sleep while inactive [Awakes Unrefreshed] : does not awake unrefreshed [Awakes with Headache] : no headache upon awakening [Awakening With Dry Mouth] : no dry mouth upon awakening [Recent  Weight Gain] : no recent weight gain [Daytime Somnolence] : no daytime somnolence [DIS] : no DIS [DMS] : no DMS [Hypersomnolence] : no hypersomnolence [Cataplexy] : no cataplexy

## 2021-09-07 ENCOUNTER — NON-APPOINTMENT (OUTPATIENT)
Age: 71
End: 2021-09-07

## 2021-12-15 ENCOUNTER — RX RENEWAL (OUTPATIENT)
Age: 71
End: 2021-12-15

## 2021-12-15 ENCOUNTER — APPOINTMENT (OUTPATIENT)
Dept: ORTHOPEDIC SURGERY | Facility: CLINIC | Age: 71
End: 2021-12-15
Payer: MEDICARE

## 2021-12-15 VITALS — BODY MASS INDEX: 34.55 KG/M2 | HEIGHT: 66 IN | WEIGHT: 215 LBS

## 2021-12-15 PROCEDURE — 99214 OFFICE O/P EST MOD 30 MIN: CPT

## 2021-12-28 ENCOUNTER — OUTPATIENT (OUTPATIENT)
Dept: OUTPATIENT SERVICES | Facility: HOSPITAL | Age: 71
LOS: 1 days | Discharge: HOME | End: 2021-12-28

## 2021-12-28 DIAGNOSIS — G56.00 CARPAL TUNNEL SYNDROME, UNSPECIFIED UPPER LIMB: Chronic | ICD-10-CM

## 2021-12-28 DIAGNOSIS — Z96.651 PRESENCE OF RIGHT ARTIFICIAL KNEE JOINT: Chronic | ICD-10-CM

## 2021-12-28 DIAGNOSIS — D47.2 MONOCLONAL GAMMOPATHY: ICD-10-CM

## 2021-12-28 DIAGNOSIS — Z53.8 PROCEDURE AND TREATMENT NOT CARRIED OUT FOR OTHER REASONS: Chronic | ICD-10-CM

## 2021-12-30 ENCOUNTER — APPOINTMENT (OUTPATIENT)
Dept: HEMATOLOGY ONCOLOGY | Facility: CLINIC | Age: 71
End: 2021-12-30
Payer: MEDICARE

## 2021-12-30 ENCOUNTER — RESULT REVIEW (OUTPATIENT)
Age: 71
End: 2021-12-30

## 2021-12-30 VITALS
RESPIRATION RATE: 14 BRPM | BODY MASS INDEX: 33.99 KG/M2 | DIASTOLIC BLOOD PRESSURE: 83 MMHG | SYSTOLIC BLOOD PRESSURE: 134 MMHG | TEMPERATURE: 97.7 F | WEIGHT: 208.98 LBS | HEART RATE: 73 BPM | OXYGEN SATURATION: 98 % | HEIGHT: 65.87 IN

## 2021-12-30 DIAGNOSIS — Z87.891 PERSONAL HISTORY OF NICOTINE DEPENDENCE: ICD-10-CM

## 2021-12-30 DIAGNOSIS — M25.512 PAIN IN LEFT SHOULDER: ICD-10-CM

## 2021-12-30 DIAGNOSIS — Z78.9 OTHER SPECIFIED HEALTH STATUS: ICD-10-CM

## 2021-12-30 DIAGNOSIS — I10 ESSENTIAL (PRIMARY) HYPERTENSION: ICD-10-CM

## 2021-12-30 LAB
ALBUMIN MFR SERPL ELPH: 60.4 %
ALBUMIN MFR SERPL ELPH: 62.3 %
ALBUMIN SERPL ELPH-MCNC: 4.4 G/DL
ALBUMIN SERPL ELPH-MCNC: 4.5 G/DL
ALBUMIN SERPL ELPH-MCNC: 4.5 G/DL
ALBUMIN SERPL-MCNC: 3.9 G/DL
ALBUMIN SERPL-MCNC: 4.1 G/DL
ALBUMIN/GLOB SERPL: 1.6 RATIO
ALBUMIN/GLOB SERPL: 1.6 RATIO
ALP BLD-CCNC: 101 U/L
ALP BLD-CCNC: 106 U/L
ALP BLD-CCNC: 114 U/L
ALPHA1 GLOB MFR SERPL ELPH: 3.9 %
ALPHA1 GLOB MFR SERPL ELPH: 4 %
ALPHA1 GLOB SERPL ELPH-MCNC: 0.2 G/DL
ALPHA1 GLOB SERPL ELPH-MCNC: 0.3 G/DL
ALPHA2 GLOB MFR SERPL ELPH: 12 %
ALPHA2 GLOB MFR SERPL ELPH: 12.3 %
ALPHA2 GLOB SERPL ELPH-MCNC: 0.8 G/DL
ALPHA2 GLOB SERPL ELPH-MCNC: 0.8 G/DL
ALT SERPL-CCNC: 17 U/L
ALT SERPL-CCNC: 19 U/L
ALT SERPL-CCNC: 20 U/L
ANION GAP SERPL CALC-SCNC: 11 MMOL/L
ANION GAP SERPL CALC-SCNC: 13 MMOL/L
ANION GAP SERPL CALC-SCNC: 14 MMOL/L
AST SERPL-CCNC: 16 U/L
AST SERPL-CCNC: 17 U/L
AST SERPL-CCNC: 19 U/L
B-GLOBULIN MFR SERPL ELPH: 8.9 %
B-GLOBULIN MFR SERPL ELPH: 9.8 %
B-GLOBULIN SERPL ELPH-MCNC: 0.6 G/DL
B-GLOBULIN SERPL ELPH-MCNC: 0.6 G/DL
B2 MICROGLOB SERPL-MCNC: 1.8 MG/L
BASOPHILS # BLD AUTO: 0.06 K/UL — SIGNIFICANT CHANGE UP (ref 0–0.2)
BASOPHILS NFR BLD AUTO: 1.1 % — SIGNIFICANT CHANGE UP (ref 0–2)
BILIRUB SERPL-MCNC: 0.5 MG/DL
BILIRUB SERPL-MCNC: 0.6 MG/DL
BILIRUB SERPL-MCNC: 0.7 MG/DL
BUN SERPL-MCNC: 15 MG/DL
BUN SERPL-MCNC: 16 MG/DL
BUN SERPL-MCNC: 20 MG/DL
CALCIUM SERPL-MCNC: 9.1 MG/DL
CALCIUM SERPL-MCNC: 9.3 MG/DL
CALCIUM SERPL-MCNC: 9.4 MG/DL
CHLORIDE SERPL-SCNC: 103 MMOL/L
CHLORIDE SERPL-SCNC: 103 MMOL/L
CHLORIDE SERPL-SCNC: 105 MMOL/L
CO2 SERPL-SCNC: 22 MMOL/L
CO2 SERPL-SCNC: 24 MMOL/L
CO2 SERPL-SCNC: 26 MMOL/L
CREAT SERPL-MCNC: 0.93 MG/DL
CREAT SERPL-MCNC: 1.04 MG/DL
CREAT SERPL-MCNC: 1.05 MG/DL
DEPRECATED KAPPA LC FREE/LAMBDA SER: 4.62 RATIO
DEPRECATED KAPPA LC FREE/LAMBDA SER: 5.64 RATIO
DEPRECATED KAPPA LC FREE/LAMBDA SER: 5.64 RATIO
EOSINOPHIL # BLD AUTO: 0.15 K/UL — SIGNIFICANT CHANGE UP (ref 0–0.5)
EOSINOPHIL NFR BLD AUTO: 2.7 % — SIGNIFICANT CHANGE UP (ref 0–6)
GAMMA GLOB FLD ELPH-MCNC: 0.8 G/DL
GAMMA GLOB FLD ELPH-MCNC: 0.9 G/DL
GAMMA GLOB MFR SERPL ELPH: 12.5 %
GAMMA GLOB MFR SERPL ELPH: 13.9 %
GLUCOSE SERPL-MCNC: 130 MG/DL
GLUCOSE SERPL-MCNC: 221 MG/DL
GLUCOSE SERPL-MCNC: 95 MG/DL
HCT VFR BLD CALC: 44.1 % — SIGNIFICANT CHANGE UP (ref 39–50)
HGB BLD-MCNC: 14.2 G/DL — SIGNIFICANT CHANGE UP (ref 13–17)
IGA SER QL IEP: 70 MG/DL
IGA SER QL IEP: 72 MG/DL
IGG SER QL IEP: 891 MG/DL
IGG SER QL IEP: 972 MG/DL
IGM SER QL IEP: 37 MG/DL
IGM SER QL IEP: 40 MG/DL
IMM GRANULOCYTES NFR BLD AUTO: 0.2 % — SIGNIFICANT CHANGE UP (ref 0–1.5)
INTERPRETATION SERPL IEP-IMP: NORMAL
INTERPRETATION SERPL IEP-IMP: NORMAL
KAPPA LC CSF-MCNC: 0.92 MG/DL
KAPPA LC CSF-MCNC: 0.92 MG/DL
KAPPA LC CSF-MCNC: 0.98 MG/DL
KAPPA LC SERPL-MCNC: 4.53 MG/DL
KAPPA LC SERPL-MCNC: 5.19 MG/DL
KAPPA LC SERPL-MCNC: 5.19 MG/DL
LDH SERPL-CCNC: 179 U/L
LDH SERPL-CCNC: 190 U/L
LDH SERPL-CCNC: 213 U/L
LYMPHOCYTES # BLD AUTO: 1.34 K/UL — SIGNIFICANT CHANGE UP (ref 1–3.3)
LYMPHOCYTES # BLD AUTO: 23.7 % — SIGNIFICANT CHANGE UP (ref 13–44)
M PROTEIN MFR SERPL ELPH: 6.1 %
M PROTEIN MFR SERPL ELPH: 7.5 %
M PROTEIN SPEC IFE-MCNC: NORMAL
M PROTEIN SPEC IFE-MCNC: NORMAL
MCHC RBC-ENTMCNC: 30.5 PG — SIGNIFICANT CHANGE UP (ref 27–34)
MCHC RBC-ENTMCNC: 32.2 G/DL — SIGNIFICANT CHANGE UP (ref 32–36)
MCV RBC AUTO: 94.8 FL — SIGNIFICANT CHANGE UP (ref 80–100)
MONOCLON BAND OBS SERPL: 0.4 G/DL
MONOCLON BAND OBS SERPL: 0.5 G/DL
MONOCYTES # BLD AUTO: 0.5 K/UL — SIGNIFICANT CHANGE UP (ref 0–0.9)
MONOCYTES NFR BLD AUTO: 8.8 % — SIGNIFICANT CHANGE UP (ref 2–14)
NEUTROPHILS # BLD AUTO: 3.59 K/UL — SIGNIFICANT CHANGE UP (ref 1.8–7.4)
NEUTROPHILS NFR BLD AUTO: 63.5 % — SIGNIFICANT CHANGE UP (ref 43–77)
NRBC # BLD: 0 /100 WBCS — SIGNIFICANT CHANGE UP (ref 0–0)
PLATELET # BLD AUTO: 190 K/UL — SIGNIFICANT CHANGE UP (ref 150–400)
POTASSIUM SERPL-SCNC: 4.5 MMOL/L
POTASSIUM SERPL-SCNC: 5 MMOL/L
POTASSIUM SERPL-SCNC: 5.1 MMOL/L
PROT SERPL-MCNC: 6.4 G/DL
PROT SERPL-MCNC: 6.6 G/DL
PROT SERPL-MCNC: 7 G/DL
RBC # BLD: 4.65 M/UL — SIGNIFICANT CHANGE UP (ref 4.2–5.8)
RBC # FLD: 13.1 % — SIGNIFICANT CHANGE UP (ref 10.3–14.5)
SODIUM SERPL-SCNC: 140 MMOL/L
WBC # BLD: 5.65 K/UL — SIGNIFICANT CHANGE UP (ref 3.8–10.5)
WBC # FLD AUTO: 5.65 K/UL — SIGNIFICANT CHANGE UP (ref 3.8–10.5)

## 2021-12-30 PROCEDURE — 99214 OFFICE O/P EST MOD 30 MIN: CPT

## 2022-01-01 LAB
DEPRECATED KAPPA LC FREE/LAMBDA SER: 8.23 RATIO
KAPPA LC CSF-MCNC: 0.83 MG/DL
KAPPA LC SERPL-MCNC: 6.83 MG/DL

## 2022-01-01 NOTE — HISTORY OF PRESENT ILLNESS
[Disease:__________________________] : Disease: [unfilled] [de-identified] : Adalberto Scanlon, was first seen at the [facility] on September 4, 2012.  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  He has some degree of unsteadiness in his gait but no falls.  Of note, he has a history of type II diabetes.\par \par He underwent a neurologic evaluation by Dr. Beatrice Ta.  An MRI of the LS spine from October 2011 showed moderate left parasagittal and femoral disc herniations at L3-L4 which were new compared to a prior study in 2005.  Compression of the left L3 nerve root was seen as well as mild compression of the left lateral aspect of the thecal sac.  Additional smaller areas of herniation were seen at L1-L2 and L4-L5. 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  The MRI was repeated on June 27, 2012 and this showed no significant changes. \par \par Immunoprotein studies were performed to further complete the neuropathy evaluation and a small M spike of 0.5 g/dl was seen along with an IgG monoclonal kappa light chain protein.  The vitamin B12 level was normal.  A Lyme titer was negative. \par \par The patient was then seen by Dr. Lauri Gilbert and a bone marrow showed trilineage maturation along with an IgG kappa plasmacytosis averaging less than 10% of the overall marrow cellularity.  The plasma cells expressed , CD20 and BCL1.  Rare paratrabecular lymphoid aggregates were seen made up of small lymphoid cells which consisted of a mixture of B and T cells and were negative for CD5 and CD10.  \par \par A skeletal survey showed no lytic or blastic lesions.\par \par 24 hour urine showed no light chain excretion.\par \par The chemistries demonstrated a normal creatinine and calcium levels as well as liver function.  A beta-2 microglobulin was normal.  The free kappa serum light chain ratio was abnormal (5.08, 0.26-1.65) with a mild elevation of the free kappa serum light chain to 36.1 mg/L.\par \par He has since had no evidence of progression of disease and has not needed treatment to date.\par \par  [de-identified] : mgus, kappa light chain [FreeTextEntry1] : no therapy to date [de-identified] : MGUS - M spike remains stable, kappa light chain and quant free light chain ratio in 5-6 range.  Stable with about 8 yrs observation - no increase in kappa free light chains. \par Creat wnl as well as Ca \par M spike stable over time 0.4-0.6; last visit 12/2020 it was 0.5\par No resp infections\par CBC results reviewed and d/w pt \par Low Back Pain -He continues to have chronic low back pain, radiating to the left leg. 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 Last Hgb A1c per pt < 7\par  \par PCs have been largely eliminated with ablations done in 9/2019 and 2/2020. .\par He remains in AF with rate regulated on Diltiazem\par Remains on eliquis

## 2022-01-05 LAB
ALBUMIN MFR SERPL ELPH: 61.2 %
ALBUMIN SERPL-MCNC: 4.3 G/DL
ALBUMIN/GLOB SERPL: 1.6 RATIO
ALPHA1 GLOB MFR SERPL ELPH: 4.2 %
ALPHA1 GLOB SERPL ELPH-MCNC: 0.3 G/DL
ALPHA2 GLOB MFR SERPL ELPH: 12.3 %
ALPHA2 GLOB SERPL ELPH-MCNC: 0.9 G/DL
B-GLOBULIN MFR SERPL ELPH: 9.7 %
B-GLOBULIN SERPL ELPH-MCNC: 0.7 G/DL
DEPRECATED KAPPA LC FREE/LAMBDA SER: 8.23 RATIO
GAMMA GLOB FLD ELPH-MCNC: 0.9 G/DL
GAMMA GLOB MFR SERPL ELPH: 12.6 %
IGA SER QL IEP: 75 MG/DL
IGG SER QL IEP: 1102 MG/DL
IGM SER QL IEP: 41 MG/DL
INTERPRETATION SERPL IEP-IMP: NORMAL
KAPPA LC CSF-MCNC: 0.83 MG/DL
KAPPA LC SERPL-MCNC: 6.83 MG/DL
M PROTEIN MFR SERPL ELPH: 6.7 %
M PROTEIN SPEC IFE-MCNC: NORMAL
MONOCLON BAND OBS SERPL: 0.5 G/DL
PROT SERPL-MCNC: 7 G/DL
PROT SERPL-MCNC: 7 G/DL

## 2022-01-24 ENCOUNTER — NON-APPOINTMENT (OUTPATIENT)
Age: 72
End: 2022-01-24

## 2022-01-24 ENCOUNTER — APPOINTMENT (OUTPATIENT)
Dept: ELECTROPHYSIOLOGY | Facility: CLINIC | Age: 72
End: 2022-01-24
Payer: MEDICARE

## 2022-01-24 VITALS
DIASTOLIC BLOOD PRESSURE: 67 MMHG | WEIGHT: 205 LBS | RESPIRATION RATE: 14 BRPM | HEIGHT: 66 IN | HEART RATE: 76 BPM | SYSTOLIC BLOOD PRESSURE: 116 MMHG | BODY MASS INDEX: 32.95 KG/M2 | OXYGEN SATURATION: 100 %

## 2022-01-24 PROCEDURE — 93000 ELECTROCARDIOGRAM COMPLETE: CPT

## 2022-01-24 PROCEDURE — 99214 OFFICE O/P EST MOD 30 MIN: CPT

## 2022-01-24 NOTE — PHYSICAL EXAM
[Well Developed] : well developed [Well Nourished] : well nourished [Normal Venous Pressure] : normal venous pressure [Normal S1, S2] : normal S1, S2 [No Murmur] : no murmur [Clear Lung Fields] : clear lung fields [Good Air Entry] : good air entry [No Respiratory Distress] : no respiratory distress  [Soft] : abdomen soft [Non Tender] : non-tender [No Edema] : no edema [No Rash] : no rash [No Skin Lesions] : no skin lesions [Normal] : moves all extremities, no focal deficits, normal speech [Moves all extremities] : moves all extremities [No Focal Deficits] : no focal deficits

## 2022-01-26 NOTE — DISCUSSION/SUMMARY
[FreeTextEntry1] : In summary, the patient is a 71-year-old gentleman with a prior history of frequent PVCs status post ablation, symptomatic paroxysmal atrial fibrillation status post ablation. Since, he has been doing very well. He has been compliant with his CPAP. He reports only one significant recurrent episode of AF in the past three months. He will continue Eliquis and Diltiazem at this time and follow-up with us in 6 months time. I did suggest obtaining a Kardia monitor to document any further arrhythmic events. All questions were answered and he knows to contact me should any query arise.

## 2022-03-16 ENCOUNTER — APPOINTMENT (OUTPATIENT)
Dept: ORTHOPEDIC SURGERY | Facility: CLINIC | Age: 72
End: 2022-03-16
Payer: MEDICARE

## 2022-03-16 VITALS
WEIGHT: 210 LBS | HEIGHT: 66 IN | BODY MASS INDEX: 33.75 KG/M2 | DIASTOLIC BLOOD PRESSURE: 74 MMHG | HEART RATE: 80 BPM | SYSTOLIC BLOOD PRESSURE: 138 MMHG

## 2022-03-16 PROCEDURE — 99214 OFFICE O/P EST MOD 30 MIN: CPT

## 2022-04-09 NOTE — PATIENT PROFILE ADULT - NSPRESCRUSEDDRG_GEN_A_NUR
Patient reports trouble breathing that started at 2000 tonight. She has a sore throat that started yesterday. Audible wheezing and tachypnea.   
No

## 2022-04-13 ENCOUNTER — APPOINTMENT (OUTPATIENT)
Dept: ORTHOPEDIC SURGERY | Facility: CLINIC | Age: 72
End: 2022-04-13
Payer: MEDICARE

## 2022-04-13 VITALS — HEART RATE: 80 BPM | DIASTOLIC BLOOD PRESSURE: 77 MMHG | SYSTOLIC BLOOD PRESSURE: 151 MMHG

## 2022-04-13 DIAGNOSIS — M48.061 SPINAL STENOSIS, LUMBAR REGION WITHOUT NEUROGENIC CLAUDICATION: ICD-10-CM

## 2022-04-13 PROCEDURE — 99214 OFFICE O/P EST MOD 30 MIN: CPT

## 2022-06-02 ENCOUNTER — OUTPATIENT (OUTPATIENT)
Dept: OUTPATIENT SERVICES | Facility: HOSPITAL | Age: 72
LOS: 1 days | Discharge: HOME | End: 2022-06-02

## 2022-06-02 DIAGNOSIS — Z96.651 PRESENCE OF RIGHT ARTIFICIAL KNEE JOINT: Chronic | ICD-10-CM

## 2022-06-02 DIAGNOSIS — D47.2 MONOCLONAL GAMMOPATHY: ICD-10-CM

## 2022-06-02 DIAGNOSIS — G56.00 CARPAL TUNNEL SYNDROME, UNSPECIFIED UPPER LIMB: Chronic | ICD-10-CM

## 2022-06-02 DIAGNOSIS — Z53.8 PROCEDURE AND TREATMENT NOT CARRIED OUT FOR OTHER REASONS: Chronic | ICD-10-CM

## 2022-06-09 ENCOUNTER — RESULT REVIEW (OUTPATIENT)
Age: 72
End: 2022-06-09

## 2022-06-09 ENCOUNTER — APPOINTMENT (OUTPATIENT)
Dept: HEMATOLOGY ONCOLOGY | Facility: CLINIC | Age: 72
End: 2022-06-09
Payer: MEDICARE

## 2022-06-09 VITALS
OXYGEN SATURATION: 95 % | RESPIRATION RATE: 16 BRPM | TEMPERATURE: 97.5 F | SYSTOLIC BLOOD PRESSURE: 127 MMHG | HEIGHT: 65.55 IN | WEIGHT: 212.06 LBS | BODY MASS INDEX: 34.91 KG/M2 | DIASTOLIC BLOOD PRESSURE: 79 MMHG | HEART RATE: 65 BPM

## 2022-06-09 DIAGNOSIS — G62.9 POLYNEUROPATHY, UNSPECIFIED: ICD-10-CM

## 2022-06-09 DIAGNOSIS — Z82.0 FAMILY HISTORY OF EPILEPSY AND OTHER DISEASES OF THE NERVOUS SYSTEM: ICD-10-CM

## 2022-06-09 DIAGNOSIS — Z80.0 FAMILY HISTORY OF MALIGNANT NEOPLASM OF DIGESTIVE ORGANS: ICD-10-CM

## 2022-06-09 LAB
BASOPHILS # BLD AUTO: 0.03 K/UL — SIGNIFICANT CHANGE UP (ref 0–0.2)
BASOPHILS NFR BLD AUTO: 0.5 % — SIGNIFICANT CHANGE UP (ref 0–2)
EOSINOPHIL # BLD AUTO: 0.16 K/UL — SIGNIFICANT CHANGE UP (ref 0–0.5)
EOSINOPHIL NFR BLD AUTO: 2.6 % — SIGNIFICANT CHANGE UP (ref 0–6)
HCT VFR BLD CALC: 43.9 % — SIGNIFICANT CHANGE UP (ref 39–50)
HGB BLD-MCNC: 14.5 G/DL — SIGNIFICANT CHANGE UP (ref 13–17)
IMM GRANULOCYTES NFR BLD AUTO: 0.2 % — SIGNIFICANT CHANGE UP (ref 0–1.5)
LYMPHOCYTES # BLD AUTO: 1.46 K/UL — SIGNIFICANT CHANGE UP (ref 1–3.3)
LYMPHOCYTES # BLD AUTO: 24.1 % — SIGNIFICANT CHANGE UP (ref 13–44)
MCHC RBC-ENTMCNC: 30.8 PG — SIGNIFICANT CHANGE UP (ref 27–34)
MCHC RBC-ENTMCNC: 33 G/DL — SIGNIFICANT CHANGE UP (ref 32–36)
MCV RBC AUTO: 93.2 FL — SIGNIFICANT CHANGE UP (ref 80–100)
MONOCYTES # BLD AUTO: 0.68 K/UL — SIGNIFICANT CHANGE UP (ref 0–0.9)
MONOCYTES NFR BLD AUTO: 11.2 % — SIGNIFICANT CHANGE UP (ref 2–14)
NEUTROPHILS # BLD AUTO: 3.73 K/UL — SIGNIFICANT CHANGE UP (ref 1.8–7.4)
NEUTROPHILS NFR BLD AUTO: 61.4 % — SIGNIFICANT CHANGE UP (ref 43–77)
NRBC # BLD: 0 /100 WBCS — SIGNIFICANT CHANGE UP (ref 0–0)
PLATELET # BLD AUTO: 203 K/UL — SIGNIFICANT CHANGE UP (ref 150–400)
RBC # BLD: 4.71 M/UL — SIGNIFICANT CHANGE UP (ref 4.2–5.8)
RBC # FLD: 13 % — SIGNIFICANT CHANGE UP (ref 10.3–14.5)
WBC # BLD: 6.07 K/UL — SIGNIFICANT CHANGE UP (ref 3.8–10.5)
WBC # FLD AUTO: 6.07 K/UL — SIGNIFICANT CHANGE UP (ref 3.8–10.5)

## 2022-06-09 PROCEDURE — 99213 OFFICE O/P EST LOW 20 MIN: CPT

## 2022-06-09 NOTE — REVIEW OF SYSTEMS
[Joint Pain] : joint pain [Joint Stiffness] : joint stiffness [Muscle Pain] : no muscle pain [Negative] : Allergic/Immunologic [FreeTextEntry9] : chronic LBP, now incr left shoulder pain -post fall

## 2022-06-09 NOTE — ASSESSMENT
[FreeTextEntry1] : MGUS - stable for several years; he remains at higher risk on account of  abnormal quantitative free serum light chain ratio.  \par In diagnostic marrow from 9/2012 overall there was < 10% plasma cells by  staining.\par Cyclin D1 was (+) suggesting presence of t(11;14).  FISH not done in outside marrow.\par Dz cont to be stable since he was first seen here over 10 years ago. No c/o suggestive of myeloma or NHL.\par No new skeletal c/o - has chronic pain related to known DJD\par Renal fxn, Ca stable\par CBC results reviewed and d/w pt\par WBC 6.07, Hgb 14.5, Plt 20-3K, nl diff\par Repeat CMP, LDH, quant free serum light chains and ratio, B2MG, immunoEP\par  \par Continue f/u of cardiac arrhythmia with Dr. Sewell\par cont mgmt of DM\par cont PT for ortho c/o\par  \par can extend f/u to one yr unless labs today point to sign change in lab parameters [Palliative] : Goals of care discussed with patient: Palliative [Palliative Care Plan] : not applicable at this time

## 2022-06-09 NOTE — CONSULT LETTER
[FreeTextEntry2] : D [FreeTextEntry3] : Germ[facility] Allen M.D., [facility]\par  [DrYuniel  ___] : Dr. FLYNN

## 2022-06-09 NOTE — HISTORY OF PRESENT ILLNESS
[Disease:__________________________] : Disease: [unfilled] [de-identified] : Adalberto Scanlon, was first seen at the [facility] on September 4, 2012.  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  He has some degree of unsteadiness in his gait but no falls.  Of note, he has a history of type II diabetes.\par \par He underwent a neurologic evaluation by Dr. Beatrice Ta.  An MRI of the LS spine from October 2011 showed moderate left parasagittal and femoral disc herniations at L3-L4 which were new compared to a prior study in 2005.  Compression of the left L3 nerve root was seen as well as mild compression of the left lateral aspect of the thecal sac.  Additional smaller areas of herniation were seen at L1-L2 and L4-L5. 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  The MRI was repeated on June 27, 2012 and this showed no significant changes. \par \par Immunoprotein studies were performed to further complete the neuropathy evaluation and a small M spike of 0.5 g/dl was seen along with an IgG monoclonal kappa light chain protein.  The vitamin B12 level was normal.  A Lyme titer was negative. \par \par The patient was then seen by Dr. Lauri Gilbert and a bone marrow showed trilineage maturation along with an IgG kappa plasmacytosis averaging less than 10% of the overall marrow cellularity.  The plasma cells expressed , CD20 and BCL1.  Rare paratrabecular lymphoid aggregates were seen made up of small lymphoid cells which consisted of a mixture of B and T cells and were negative for CD5 and CD10.  \par \par A skeletal survey showed no lytic or blastic lesions.\par \par 24 hour urine showed no light chain excretion.\par \par The chemistries demonstrated a normal creatinine and calcium levels as well as liver function.  A beta-2 microglobulin was normal.  The free kappa serum light chain ratio was abnormal (5.08, 0.26-1.65) with a mild elevation of the free kappa serum light chain to 36.1 mg/L.\par \par He has since had no evidence of progression of disease and has not needed treatment to date.\par \par  [de-identified] : mgus, kappa light chain [FreeTextEntry1] : no therapy to date [de-identified] : MGUS - M spike remains stable\par Creat wnl as well as Ca \par M spike stable over time 0.4-0.6; last visit 12/2021 it was 0.5\par No resp infections\par CBC results reviewed and d/w pt \par Low Back Pain -He continues to have chronic low back pain, radiating to the left leg. \par LLE weakness at times.  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 Last Hgb A1c per p 6\par AF with rate regulated on Diltiazem\par Remains on eliquis

## 2022-06-09 NOTE — PHYSICAL EXAM
[Fully active, able to carry on all pre-disease performance without restriction] : Status 0 - Fully active, able to carry on all pre-disease performance without restriction [Normal] : affect appropriate [de-identified] : no CVAT

## 2022-06-12 LAB
ALBUMIN SERPL ELPH-MCNC: 4.8 G/DL
ALP BLD-CCNC: 100 U/L
ALT SERPL-CCNC: 18 U/L
ANION GAP SERPL CALC-SCNC: 12 MMOL/L
AST SERPL-CCNC: 22 U/L
BILIRUB SERPL-MCNC: 0.8 MG/DL
BUN SERPL-MCNC: 19 MG/DL
CALCIUM SERPL-MCNC: 9.2 MG/DL
CHLORIDE SERPL-SCNC: 104 MMOL/L
CO2 SERPL-SCNC: 24 MMOL/L
CREAT SERPL-MCNC: 0.98 MG/DL
EGFR: 82 ML/MIN/1.73M2
GLUCOSE SERPL-MCNC: 78 MG/DL
POTASSIUM SERPL-SCNC: 4.2 MMOL/L
PROT SERPL-MCNC: 7.2 G/DL
SODIUM SERPL-SCNC: 140 MMOL/L

## 2022-06-20 ENCOUNTER — RX RENEWAL (OUTPATIENT)
Age: 72
End: 2022-06-20

## 2022-07-02 LAB
ALBUMIN MFR SERPL ELPH: 60.8 %
ALBUMIN SERPL-MCNC: 4.4 G/DL
ALBUMIN/GLOB SERPL: 1.6 RATIO
ALPHA1 GLOB MFR SERPL ELPH: 4 %
ALPHA1 GLOB SERPL ELPH-MCNC: 0.3 G/DL
ALPHA2 GLOB MFR SERPL ELPH: 12.6 %
ALPHA2 GLOB SERPL ELPH-MCNC: 0.9 G/DL
B-GLOBULIN MFR SERPL ELPH: 9.6 %
B-GLOBULIN SERPL ELPH-MCNC: 0.7 G/DL
DEPRECATED KAPPA LC FREE/LAMBDA SER: 7.1 RATIO
GAMMA GLOB FLD ELPH-MCNC: 0.9 G/DL
GAMMA GLOB MFR SERPL ELPH: 13 %
IGA SER QL IEP: 73 MG/DL
IGG SER QL IEP: 951 MG/DL
IGM SER QL IEP: 41 MG/DL
INTERPRETATION SERPL IEP-IMP: NORMAL
KAPPA LC CSF-MCNC: 1.06 MG/DL
KAPPA LC SERPL-MCNC: 7.53 MG/DL
M PROTEIN MFR SERPL ELPH: 6 %
M PROTEIN SPEC IFE-MCNC: NORMAL
MONOCLON BAND OBS SERPL: 0.4 G/DL
PROT SERPL-MCNC: 7.2 G/DL
PROT SERPL-MCNC: 7.2 G/DL

## 2022-07-22 NOTE — PATIENT PROFILE ADULT - NSPROPTRIGHTCAREGIVER_GEN_A_NUR
No other labs at this time, reggie
Please advise
Pt had labs done and she wants to know if there is anything additional. Please advise she is scheduled for her wellness on   Future Appointments   Date Time Provider Eric Campbell   8/10/2020  4:00 PM Dina Monaco MD [facility]
Pt has a lab ordered and wanted to know if there is anything else she should needs done for cpe. Please advise. Orders to [facility]. Pt aware to get labs done no sooner than 2 weeks prior to the appt. Pt aware to fast.  No call back required.     Future A
SW patient to let her know per TB no further labs need to be done.
GDMA1/Gestational Diabetes
declines

## 2022-10-10 ENCOUNTER — NON-APPOINTMENT (OUTPATIENT)
Age: 72
End: 2022-10-10

## 2022-10-10 ENCOUNTER — APPOINTMENT (OUTPATIENT)
Dept: ELECTROPHYSIOLOGY | Facility: CLINIC | Age: 72
End: 2022-10-10

## 2022-10-10 VITALS
WEIGHT: 210 LBS | HEIGHT: 66 IN | OXYGEN SATURATION: 99 % | SYSTOLIC BLOOD PRESSURE: 126 MMHG | RESPIRATION RATE: 14 BRPM | HEART RATE: 72 BPM | BODY MASS INDEX: 33.75 KG/M2 | DIASTOLIC BLOOD PRESSURE: 76 MMHG

## 2022-10-10 PROCEDURE — 93000 ELECTROCARDIOGRAM COMPLETE: CPT

## 2022-10-10 PROCEDURE — 99213 OFFICE O/P EST LOW 20 MIN: CPT

## 2022-10-10 RX ORDER — IRBESARTAN 75 MG/1
75 TABLET ORAL
Refills: 0 | Status: ACTIVE | COMMUNITY
Start: 2019-07-08

## 2022-10-10 RX ORDER — SITAGLIPTIN 100 MG/1
100 TABLET, FILM COATED ORAL
Refills: 0 | Status: ACTIVE | COMMUNITY
Start: 2018-06-28
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
## 2022-10-14 NOTE — DISCUSSION/SUMMARY
[EKG obtained to assist in diagnosis and management of assessed problem(s)] : EKG obtained to assist in diagnosis and management of assessed problem(s) [FreeTextEntry1] : In summary, the patient is a 72-year-old gentleman with a prior history of frequent PVCs status post ablation, symptomatic paroxysmal atrial fibrillation status post ablation. Since, he has been doing very well. He has been compliant with his CPAP. He will continue Eliquis and Diltiazem at this time and follow-up with us in 6 months time. I advised him it is OK to stop his Eliquis 3 days prior to his epidural and can resume it whenever the injecting physician thinks its safe.

## 2022-11-28 ENCOUNTER — NON-APPOINTMENT (OUTPATIENT)
Age: 72
End: 2022-11-28

## 2022-11-28 ENCOUNTER — APPOINTMENT (OUTPATIENT)
Dept: ORTHOPEDIC SURGERY | Facility: CLINIC | Age: 72
End: 2022-11-28

## 2022-11-28 VITALS — DIASTOLIC BLOOD PRESSURE: 68 MMHG | HEART RATE: 81 BPM | SYSTOLIC BLOOD PRESSURE: 124 MMHG

## 2022-11-28 PROCEDURE — 99214 OFFICE O/P EST MOD 30 MIN: CPT

## 2022-11-28 PROCEDURE — 99072 ADDL SUPL MATRL&STAF TM PHE: CPT

## 2022-11-28 RX ORDER — BLOOD SUGAR DIAGNOSTIC
STRIP MISCELLANEOUS
Qty: 200 | Refills: 0 | Status: ACTIVE | COMMUNITY
Start: 2022-04-08

## 2022-11-28 RX ORDER — LANCETS
EACH MISCELLANEOUS
Qty: 100 | Refills: 0 | Status: ACTIVE | COMMUNITY
Start: 2022-04-08

## 2022-11-28 NOTE — DISCUSSION/SUMMARY
[Medication Risks Reviewed] : Medication risks reviewed [de-identified] : Patient is doing everything that he can within reason considering his other medical comorbidities regarding the neck and back.  I recommended that he continue with pain management and chiropractic treatments.  He is currently not a surgical candidate at this time.  He will follow-up with us in 3 months, sooner if symptoms acutely worsen.
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
## 2022-11-28 NOTE — PHYSICAL EXAM
[Wide-Based] : wide-based [UE/LE] : Motor: 5/5 motor strength in bilateral upper & lower extremities [] : Sensory: [C5-Bilat] : C5 [C6-Bilat] : C6 [C7-Bilat] : C7 [S1-Bilat] : S1 [Normal RUE] : Right Upper Extremity: No scars, rashes, lesions, ulcers, skin intact [Normal LUE] : Left Upper Extremity: No scars, rashes, lesions, ulcers, skin intact [Normal] : Oriented to person, place, and time, insight and judgement were intact and the affect was normal [de-identified] : EMG evaluation of the upper extremities obtained on August 31, 2022 is positive for bilateral carpal tunnel syndrome and a left C6 radiculopathy.\par \par EMG evaluation of the lower extremities obtained was found first, 2022 shows a left S1 radiculopathy.\par \par MRI of the lumbar spine obtained on August 18, 2022 shows an L5-S1 left foraminal herniated disc along with multilevel degenerative disease and stenosis.\par \par MRI evaluation of the cervical spine obtained on August 18, 2022 is positive for a left C 5-6 herniated disc and multilevel spondylosis.

## 2022-12-23 ENCOUNTER — RX RENEWAL (OUTPATIENT)
Age: 72
End: 2022-12-23

## 2023-02-09 ENCOUNTER — APPOINTMENT (OUTPATIENT)
Dept: PULMONOLOGY | Facility: CLINIC | Age: 73
End: 2023-02-09
Payer: MEDICARE

## 2023-02-09 VITALS
SYSTOLIC BLOOD PRESSURE: 120 MMHG | HEIGHT: 66 IN | WEIGHT: 208 LBS | BODY MASS INDEX: 33.43 KG/M2 | OXYGEN SATURATION: 98 % | DIASTOLIC BLOOD PRESSURE: 70 MMHG | HEART RATE: 76 BPM

## 2023-02-09 PROCEDURE — 99213 OFFICE O/P EST LOW 20 MIN: CPT | Mod: GC
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
## 2023-02-09 NOTE — PHYSICAL EXAM
[General Appearance - Well Developed] : well developed [Normal Appearance] : normal appearance [Well Groomed] : well groomed [General Appearance - Well Nourished] : well nourished [Normal Conjunctiva] : the conjunctiva exhibited no abnormalities [Neck Appearance] : the appearance of the neck was normal [Neck Cervical Mass (___cm)] : no neck mass was observed [Heart Rate And Rhythm] : heart rate was normal and rhythm regular [Heart Sounds] : normal S1 and S2 [Respiration, Rhythm And Depth] : normal respiratory rhythm and effort [Exaggerated Use Of Accessory Muscles For Inspiration] : no accessory muscle use [Auscultation Breath Sounds / Voice Sounds] : lungs were clear to auscultation bilaterally [Abnormal Walk] : normal gait [Nail Clubbing] : no clubbing  or cyanosis of the fingernails [Skin Color & Pigmentation] : normal skin color and pigmentation [] : no rash [No Focal Deficits] : no focal deficits [Affect] : the affect was normal [Mood] : the mood was normal

## 2023-02-09 NOTE — ASSESSMENT
[FreeTextEntry1] : This is a 71 y/o M with PMHx of severe ERIK on CPAP, Afib s/p ablation on eliquis, HTN, DM, class I obesity who presents for follow up.\par \par 1) Severe ERIK on CPAP - Compliance data shows excellent compliance with adequate control of ERIK (AHI 0.3). He is deriving benefit from therapy and will continue to use CPAP nightly.\par \par Follow up annually.

## 2023-02-27 ENCOUNTER — APPOINTMENT (OUTPATIENT)
Dept: ORTHOPEDIC SURGERY | Facility: CLINIC | Age: 73
End: 2023-02-27
Payer: COMMERCIAL

## 2023-02-27 VITALS — SYSTOLIC BLOOD PRESSURE: 132 MMHG | HEART RATE: 74 BPM | DIASTOLIC BLOOD PRESSURE: 78 MMHG

## 2023-02-27 DIAGNOSIS — M48.062 SPINAL STENOSIS, LUMBAR REGION WITH NEUROGENIC CLAUDICATION: ICD-10-CM

## 2023-02-27 DIAGNOSIS — M51.26 OTHER INTERVERTEBRAL DISC DISPLACEMENT, LUMBAR REGION: ICD-10-CM

## 2023-02-27 PROCEDURE — 99072 ADDL SUPL MATRL&STAF TM PHE: CPT

## 2023-02-27 PROCEDURE — 99214 OFFICE O/P EST MOD 30 MIN: CPT

## 2023-02-27 NOTE — PHYSICAL EXAM
[Wide-Based] : wide-based [UE/LE] : Motor: 5/5 motor strength in bilateral upper & lower extremities [] : Sensory: [C5-Bilat] : C5 [C6-Bilat] : C6 [C7-Bilat] : C7 [S1-Bilat] : S1 [Normal RUE] : Right Upper Extremity: No scars, rashes, lesions, ulcers, skin intact [Normal LUE] : Left Upper Extremity: No scars, rashes, lesions, ulcers, skin intact [Normal] : Oriented to person, place, and time, insight and judgement were intact and the affect was normal [de-identified] : EMGs obtained on January 12, 2023 reveals a C6 radiculopathy which is left-sided.  It also shows severe carpal tunnel syndrome on that side and a polyneuropathy due to diabetes.
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
## 2023-02-27 NOTE — ADDENDUM
[FreeTextEntry1] : I, Isac Hanley Jr, documented this note as a scribe on the behalf of Dr. Lauro Amaro MD.

## 2023-02-27 NOTE — HISTORY OF PRESENT ILLNESS
[3] : a current pain level of 3/10 [Intermit.] : ~He/She~ states the symptoms seem to be intermittent [Walking] : worsened by walking [de-identified] : Patient presents a follow-up foe neck & lower back pain. The patient had a epidural injection and has been attending pain management. He states that the epidural had improved his symptoms by 75% and also had an EMG. He has constant numbness to his hands and concerns are with the numbness in his hands. He also reports his symptoms to his lower back have worsened.  Patient received 2 epidural injections the first was November 1, 2022 at the L5-S1 level which was not as effective as the second obtained January 10, 2023 which is at the L4-5 level.\par

## 2023-02-27 NOTE — DISCUSSION/SUMMARY
[Other: ____] : in [unfilled] [de-identified] : I expressed to the patient that if he is not experiencing any severe pain or symptoms that prevent him from continuing his daily lifestyles, he should not seek any major interventions. I informed him that his best option at this time is to carry on with the symptoms and to continue his current treatment regimen for his symptoms. The patient will follow-up in 6 months. If the patient's symptoms worsen, we will further discuss other potential treatment options.

## 2023-04-10 ENCOUNTER — NON-APPOINTMENT (OUTPATIENT)
Age: 73
End: 2023-04-10

## 2023-04-10 ENCOUNTER — APPOINTMENT (OUTPATIENT)
Dept: ELECTROPHYSIOLOGY | Facility: CLINIC | Age: 73
End: 2023-04-10
Payer: MEDICARE

## 2023-04-10 VITALS
HEART RATE: 74 BPM | WEIGHT: 205 LBS | RESPIRATION RATE: 14 BRPM | DIASTOLIC BLOOD PRESSURE: 80 MMHG | HEIGHT: 66 IN | BODY MASS INDEX: 32.95 KG/M2 | OXYGEN SATURATION: 95 % | SYSTOLIC BLOOD PRESSURE: 120 MMHG

## 2023-04-10 PROCEDURE — 99213 OFFICE O/P EST LOW 20 MIN: CPT

## 2023-04-10 PROCEDURE — 93000 ELECTROCARDIOGRAM COMPLETE: CPT

## 2023-04-13 NOTE — DISCUSSION/SUMMARY
[EKG obtained to assist in diagnosis and management of assessed problem(s)] : EKG obtained to assist in diagnosis and management of assessed problem(s) [FreeTextEntry1] : In summary, the patient is a 73-year-old gentleman with a prior history of frequent PVCs status post ablation, symptomatic paroxysmal atrial fibrillation status post ablation. Since, he has been doing very well. He has been compliant with his CPAP. He will continue Eliquis and Diltiazem at this time and follow-up with us in 6 months time. He will be getting a stress test when he can walk better shortly (although his CP does not sound anginal)
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
## 2023-05-25 ENCOUNTER — OUTPATIENT (OUTPATIENT)
Dept: OUTPATIENT SERVICES | Facility: HOSPITAL | Age: 73
LOS: 1 days | Discharge: HOME | End: 2023-05-25

## 2023-05-25 DIAGNOSIS — Z96.651 PRESENCE OF RIGHT ARTIFICIAL KNEE JOINT: Chronic | ICD-10-CM

## 2023-05-25 DIAGNOSIS — Z53.8 PROCEDURE AND TREATMENT NOT CARRIED OUT FOR OTHER REASONS: Chronic | ICD-10-CM

## 2023-05-25 DIAGNOSIS — D47.2 MONOCLONAL GAMMOPATHY: ICD-10-CM

## 2023-05-25 DIAGNOSIS — G56.00 CARPAL TUNNEL SYNDROME, UNSPECIFIED UPPER LIMB: Chronic | ICD-10-CM

## 2023-06-08 ENCOUNTER — RESULT REVIEW (OUTPATIENT)
Age: 73
End: 2023-06-08

## 2023-06-08 ENCOUNTER — APPOINTMENT (OUTPATIENT)
Dept: HEMATOLOGY ONCOLOGY | Facility: CLINIC | Age: 73
End: 2023-06-08
Payer: MEDICARE

## 2023-06-08 VITALS
RESPIRATION RATE: 16 BRPM | DIASTOLIC BLOOD PRESSURE: 77 MMHG | SYSTOLIC BLOOD PRESSURE: 132 MMHG | TEMPERATURE: 97.9 F | BODY MASS INDEX: 33.8 KG/M2 | OXYGEN SATURATION: 97 % | WEIGHT: 209.44 LBS | HEART RATE: 70 BPM

## 2023-06-08 DIAGNOSIS — E11.9 TYPE 2 DIABETES MELLITUS W/OUT COMPLICATIONS: ICD-10-CM

## 2023-06-08 DIAGNOSIS — M54.50 LOW BACK PAIN, UNSPECIFIED: ICD-10-CM

## 2023-06-08 LAB
BASOPHILS # BLD AUTO: 0.06 K/UL — SIGNIFICANT CHANGE UP (ref 0–0.2)
BASOPHILS NFR BLD AUTO: 1.1 % — SIGNIFICANT CHANGE UP (ref 0–2)
EOSINOPHIL # BLD AUTO: 0.18 K/UL — SIGNIFICANT CHANGE UP (ref 0–0.5)
EOSINOPHIL NFR BLD AUTO: 3.2 % — SIGNIFICANT CHANGE UP (ref 0–6)
HCT VFR BLD CALC: 45 % — SIGNIFICANT CHANGE UP (ref 39–50)
HGB BLD-MCNC: 14.6 G/DL — SIGNIFICANT CHANGE UP (ref 13–17)
IMM GRANULOCYTES NFR BLD AUTO: 0.2 % — SIGNIFICANT CHANGE UP (ref 0–0.9)
LYMPHOCYTES # BLD AUTO: 1.35 K/UL — SIGNIFICANT CHANGE UP (ref 1–3.3)
LYMPHOCYTES # BLD AUTO: 23.8 % — SIGNIFICANT CHANGE UP (ref 13–44)
MCHC RBC-ENTMCNC: 30.5 PG — SIGNIFICANT CHANGE UP (ref 27–34)
MCHC RBC-ENTMCNC: 32.4 G/DL — SIGNIFICANT CHANGE UP (ref 32–36)
MCV RBC AUTO: 93.9 FL — SIGNIFICANT CHANGE UP (ref 80–100)
MONOCYTES # BLD AUTO: 0.63 K/UL — SIGNIFICANT CHANGE UP (ref 0–0.9)
MONOCYTES NFR BLD AUTO: 11.1 % — SIGNIFICANT CHANGE UP (ref 2–14)
NEUTROPHILS # BLD AUTO: 3.44 K/UL — SIGNIFICANT CHANGE UP (ref 1.8–7.4)
NEUTROPHILS NFR BLD AUTO: 60.6 % — SIGNIFICANT CHANGE UP (ref 43–77)
NRBC # BLD: 0 /100 WBCS — SIGNIFICANT CHANGE UP (ref 0–0)
PLATELET # BLD AUTO: 186 K/UL — SIGNIFICANT CHANGE UP (ref 150–400)
RBC # BLD: 4.79 M/UL — SIGNIFICANT CHANGE UP (ref 4.2–5.8)
RBC # FLD: 13.1 % — SIGNIFICANT CHANGE UP (ref 10.3–14.5)
WBC # BLD: 5.67 K/UL — SIGNIFICANT CHANGE UP (ref 3.8–10.5)
WBC # FLD AUTO: 5.67 K/UL — SIGNIFICANT CHANGE UP (ref 3.8–10.5)

## 2023-06-08 PROCEDURE — 99213 OFFICE O/P EST LOW 20 MIN: CPT

## 2023-06-08 NOTE — ASSESSMENT
[Palliative] : Goals of care discussed with patient: Palliative [Palliative Care Plan] : not applicable at this time [FreeTextEntry1] : MGUS - stable for several years; he remains at higher risk on account of  abnormal quantitative free serum light chain ratio.  \par In diagnostic marrow from 9/2012 overall there was < 10% plasma cells by  staining.\par Cyclin D1 was (+) suggesting presence of t(11;14).  FISH not done in outside marrow.\par Dz cont to be stable since he was first seen here over 10 years ago. No c/o suggestive of myeloma or NHL.\par No new skeletal c/o - has chronic pain related to known DJD\par Renal fxn, Ca stable\par CBC results reviewed and d/w pt\par WBC 5.67, Hgb 14.6, Plt 186K, nl diff\par Repeat CMP, LDH, quant free serum light chains and ratio, B2MG, immunoEP\par last few immunoprotein studies stable; has mildly decr IgA\par stably abnl FLC studies\par  \par Continue f/u of cardiac arrhythmia with Dr. Sewell\par cont mgmt of DM\par cont PT for ortho c/o\par  \par can extend f/u to one yr unless labs today point to sign change in lab parameters
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
## 2023-06-08 NOTE — PHYSICAL EXAM
[Fully active, able to carry on all pre-disease performance without restriction] : Status 0 - Fully active, able to carry on all pre-disease performance without restriction [Normal] : affect appropriate [de-identified] : no CVAT

## 2023-06-08 NOTE — CONSULT LETTER
[DrYuniel  ___] : Dr. FLYNN [FreeTextEntry3] : Germán Allen M.D., [facility]\par  [DrYuniel ___] : Dr. FLYNN

## 2023-07-31 ENCOUNTER — APPOINTMENT (OUTPATIENT)
Dept: ORTHOPEDIC SURGERY | Facility: CLINIC | Age: 73
End: 2023-07-31
Payer: COMMERCIAL

## 2023-07-31 PROCEDURE — 99214 OFFICE O/P EST MOD 30 MIN: CPT

## 2023-07-31 NOTE — HISTORY OF PRESENT ILLNESS
[Intermit.] : ~He/She~ states the symptoms seem to be intermittent [de-identified] : Patient presents a follow-up visit for back & neck pain. The patient states that he continues to have pain to his neck and have more numbness & tingling pertaining to his hands. The patient reports no significant changes to their symptoms since their last visit. He recently obtained an EMG to review today for further assessment of his symptoms. He states thhat his symptoms to his lower back is still constant, however is managing well with physical therapy twice a week. The patient reports no other modifying factors at this time.  [Stable] : stable

## 2023-07-31 NOTE — ADDENDUM
[FreeTextEntry1] : I, Isac Hanley Jr, acted solely as a scribe for Dr. Lauro Amaro on this date 07/31/2023 .  All medical record entries made by the Scribe were at my, Dr. Lauro Amaro, direction and personally dictated by me on 07/31/2023 . I have reviewed the chart and agree that the record accurately reflects my personal performance of the history, physical exam, assessment and plan. I have also personally directed, reviewed, and agreed with the chart.

## 2023-07-31 NOTE — DISCUSSION/SUMMARY
[Other: ____] : in [unfilled] [de-identified] : I discussed the underlying pathophysiology of the patient's condition & EMG findings. I explained to the patient that his symptoms are consistent with more than one singular issue, consistent with carpel tunnel and neck issues. Activity modifications were reviewed with the patient at length. I advised that the patient should consult with a hand surgeon for possible carpal tunnel release of the left wrist. I advised that the patient should continue their current regimen if he has found some alleviation to his symptoms and avoid cervical spine surgery at this time. The patient should continue to remain active concerning his lumbar spine. If the patient has any severe exacerbation of his symptoms, he should follow-up with me as soon as possible. Otherwise, the patient should follow-up with me in 6 Months.

## 2023-08-28 NOTE — ASSESSMENT
LMTCB [FreeTextEntry1] : 72 yo M hx Afib s/p ablation (9/2019 and 2/2020) on Eliquis, severe ERIK, HTN, DM, MGUS presenting for follow up\par \par 1) Severe ERIK - Symptoms are well-controlled and AHI on APAP therapy is within normal limits. Patient has registered the device on [employer], and has been provided information regarding the potential replacement/ repair plans proposed by [employer]. We have extensively discussed the theoretical risks against the benefits of using the APAP machine, which we recommended continuing APAP treatment given the severe nature of his ERIK in the context of his co-morbidities including AF and HTN. Oral appliance is an option but will less likely be effective given severe ERIK. Inspire device has been discussed but patient had a fall within the last few years requiring further orthopedic workup including MRI's of the upper and/or lower back, which Inspire would preclude him from getting. We have discussed paying out of pocket to purchase a new ResMed machine, which the patient is considering. He decided to continue with the current Harir machine but is to call us back if he wishes to purchase a non Karli device via a DME provider.   issues related to the polyester-based polyurethane (PE-PUR) sound abatement foam used in these devices.  The potential harm of inhalation or ingestion of the foam particles was discussed in detail with the patient.  Symptoms such as headache, upper airway irritation, cough, were discussed at length with the patient. \par Given that the patient has severe sleep disordered breathing, the decision was made to continue therapy after a very thorough discussion of the risks and benefits of continued use until their repaired or fully replaced\par \par Patient was counseled to not use ozone-related cleaning products and adhere to their device Instructions for Use for approved cleaning methods.\par \par The dangers of drowsy driving were discussed with the patient.  The patient was warned to avoid drowsy driving. The patient will follow-up after he/she has heard from the [employer] company.

## 2023-09-10 LAB
ALBUMIN MFR SERPL ELPH: 62.2 %
ALBUMIN SERPL ELPH-MCNC: 4.5 G/DL
ALBUMIN SERPL-MCNC: 4.2 G/DL
ALBUMIN/GLOB SERPL: 1.6 RATIO
ALP BLD-CCNC: 101 U/L
ALPHA1 GLOB MFR SERPL ELPH: 4.2 %
ALPHA1 GLOB SERPL ELPH-MCNC: 0.3 G/DL
ALPHA2 GLOB MFR SERPL ELPH: 12.3 %
ALPHA2 GLOB SERPL ELPH-MCNC: 0.8 G/DL
ALT SERPL-CCNC: 20 U/L
ANION GAP SERPL CALC-SCNC: 15 MMOL/L
AST SERPL-CCNC: 18 U/L
B-GLOBULIN MFR SERPL ELPH: 9.3 %
B-GLOBULIN SERPL ELPH-MCNC: 0.6 G/DL
B2 MICROGLOB SERPL-MCNC: 1.7 MG/L
BILIRUB SERPL-MCNC: 0.5 MG/DL
BUN SERPL-MCNC: 17 MG/DL
CALCIUM SERPL-MCNC: 9.4 MG/DL
CHLORIDE SERPL-SCNC: 102 MMOL/L
CO2 SERPL-SCNC: 24 MMOL/L
CREAT SERPL-MCNC: 0.94 MG/DL
DEPRECATED KAPPA LC FREE/LAMBDA SER: 8.72 RATIO
EGFR: 86 ML/MIN/1.73M2
GAMMA GLOB FLD ELPH-MCNC: 0.8 G/DL
GAMMA GLOB MFR SERPL ELPH: 12 %
GLUCOSE SERPL-MCNC: 99 MG/DL
IGA SER QL IEP: 61 MG/DL
IGG SER QL IEP: 823 MG/DL
IGM SER QL IEP: 38 MG/DL
INTERPRETATION SERPL IEP-IMP: NORMAL
KAPPA LC CSF-MCNC: 1.11 MG/DL
KAPPA LC SERPL-MCNC: 9.68 MG/DL
M PROTEIN MFR SERPL ELPH: 6.2 %
M PROTEIN SPEC IFE-MCNC: NORMAL
MONOCLON BAND OBS SERPL: 0.4 G/DL
POTASSIUM SERPL-SCNC: 4.4 MMOL/L
PROT SERPL-MCNC: 6.8 G/DL
SODIUM SERPL-SCNC: 141 MMOL/L

## 2023-10-30 ENCOUNTER — APPOINTMENT (OUTPATIENT)
Dept: ELECTROPHYSIOLOGY | Facility: CLINIC | Age: 73
End: 2023-10-30
Payer: MEDICARE

## 2023-10-30 VITALS
HEIGHT: 66 IN | SYSTOLIC BLOOD PRESSURE: 144 MMHG | DIASTOLIC BLOOD PRESSURE: 88 MMHG | BODY MASS INDEX: 32.95 KG/M2 | RESPIRATION RATE: 14 BRPM | HEART RATE: 64 BPM | OXYGEN SATURATION: 95 % | WEIGHT: 205 LBS

## 2023-10-30 PROCEDURE — 99214 OFFICE O/P EST MOD 30 MIN: CPT

## 2023-10-30 PROCEDURE — 93000 ELECTROCARDIOGRAM COMPLETE: CPT

## 2023-10-30 RX ORDER — TRIAMCINOLONE ACETONIDE 1 MG/G
0.1 OINTMENT TOPICAL
Qty: 80 | Refills: 0 | Status: DISCONTINUED | COMMUNITY
Start: 2023-07-21 | End: 2023-10-30

## 2023-11-20 NOTE — PATIENT PROFILE ADULT - EQUIPMENT CURRENTLY USED AT HOME
[facility]:  Ambulatory Surgery [facility]:  Ambulatory Surgery [facility]:  Ambulatory Surgery [facility] no

## 2024-01-10 ENCOUNTER — APPOINTMENT (OUTPATIENT)
Dept: ORTHOPEDIC SURGERY | Facility: CLINIC | Age: 74
End: 2024-01-10
Payer: MEDICARE

## 2024-01-10 DIAGNOSIS — M50.20 OTHER CERVICAL DISC DISPLACEMENT, UNSPECIFIED CERVICAL REGION: ICD-10-CM

## 2024-01-10 PROCEDURE — 72050 X-RAY EXAM NECK SPINE 4/5VWS: CPT

## 2024-01-10 PROCEDURE — 99214 OFFICE O/P EST MOD 30 MIN: CPT

## 2024-01-10 NOTE — ADDENDUM
[FreeTextEntry1] : I, Isac Hanley Jr, acted solely as a scribe for Dr. Lauro Amaro on this date 01/10/2024 .  All medical record entries made by the Scribe were at my, Dr. Lauro Amaro, direction and personally dictated by me on 01/10/2024 . I have reviewed the chart and agree that the record accurately reflects my personal performance of the history, physical exam, assessment and plan. I have also personally directed, reviewed, and agreed with the chart.

## 2024-01-10 NOTE — PHYSICAL EXAM
[Wide-Based] : wide-based [UE/LE] : Motor: 5/5 motor strength in bilateral upper & lower extremities [] : Sensory: [C5-Bilat] : C5 [C6-Bilat] : C6 [C7-Bilat] : C7 [S1-Bilat] : S1 [Normal RUE] : Right Upper Extremity: No scars, rashes, lesions, ulcers, skin intact [Normal LUE] : Left Upper Extremity: No scars, rashes, lesions, ulcers, skin intact [Normal] : Oriented to person, place, and time, insight and judgement were intact and the affect was normal [de-identified] : Range of motion is full but painful to the cervical spine. [de-identified] : AP & Lateral Flexion Extension X-Rays of the Cervical Spine performed on 01/10/2024 shows Multilevel Degenerative Changes. No instability on Flexion Extension. No fractures, tumors, or bony lesions.

## 2024-01-10 NOTE — HISTORY OF PRESENT ILLNESS
[Intermit.] : ~He/She~ states the symptoms seem to be intermittent [de-identified] : Patient presents a follow-up visit for Cervical DDD. The patient states that he continues to have neck pain with radiculopathy to his left upper extremity. He reports that about 2 weeks ago, he felt a severe burning sensation along with numbness & tingling to his left hand at night, which prevented him from sleeping comfortably. The patient denies any headaches, dizziness, or lightheadedness. The patient reports no modifying factors at this time. The patient continues to take blood thinners.  He has a history of diabetic neuropathy.  There is no recent history of trauma or injury since his last visit with us.

## 2024-01-10 NOTE — DISCUSSION/SUMMARY
[2 Weeks] : in 2 weeks [Medication Risks Reviewed] : Medication risks reviewed [de-identified] : I discussed the underlying pathophysiology of the patient's condition & X-Ray findings. I expressed to the patient that her symptoms may be related to a pinched nerve in his neck which may have worsened based on progressed degenerative arthritis in his neck. At this time, it would be in his best interest to obtain additional studies in the form of an MRI. I provided the patient a prescription to obtain an MRI along with Gabapentin to take at night. The patient should follow-up with me in 1-2 weeks to review the results of the MRI & further assessment of his symptoms.  We will consider pain management referral if needed on return visit.

## 2024-01-11 ENCOUNTER — OUTPATIENT (OUTPATIENT)
Dept: OUTPATIENT SERVICES | Facility: HOSPITAL | Age: 74
LOS: 1 days | End: 2024-01-11
Payer: MEDICARE

## 2024-01-11 ENCOUNTER — APPOINTMENT (OUTPATIENT)
Dept: MRI IMAGING | Facility: IMAGING CENTER | Age: 74
End: 2024-01-11
Payer: MEDICARE

## 2024-01-11 DIAGNOSIS — M50.20 OTHER CERVICAL DISC DISPLACEMENT, UNSPECIFIED CERVICAL REGION: ICD-10-CM

## 2024-01-11 DIAGNOSIS — M47.812 SPONDYLOSIS WITHOUT MYELOPATHY OR RADICULOPATHY, CERVICAL REGION: ICD-10-CM

## 2024-01-11 DIAGNOSIS — Z53.8 PROCEDURE AND TREATMENT NOT CARRIED OUT FOR OTHER REASONS: Chronic | ICD-10-CM

## 2024-01-11 DIAGNOSIS — G56.00 CARPAL TUNNEL SYNDROME, UNSPECIFIED UPPER LIMB: Chronic | ICD-10-CM

## 2024-01-11 PROCEDURE — 72141 MRI NECK SPINE W/O DYE: CPT

## 2024-01-11 PROCEDURE — 72141 MRI NECK SPINE W/O DYE: CPT | Mod: 26,MH

## 2024-01-17 ENCOUNTER — APPOINTMENT (OUTPATIENT)
Dept: ORTHOPEDIC SURGERY | Facility: CLINIC | Age: 74
End: 2024-01-17
Payer: MEDICARE

## 2024-01-17 DIAGNOSIS — M47.812 SPONDYLOSIS W/OUT MYELOPATHY OR RADICULOPATHY, CERVICAL REGION: ICD-10-CM

## 2024-01-17 DIAGNOSIS — M54.12 RADICULOPATHY, CERVICAL REGION: ICD-10-CM

## 2024-01-17 PROCEDURE — 99214 OFFICE O/P EST MOD 30 MIN: CPT

## 2024-01-17 NOTE — DISCUSSION/SUMMARY
[Medication Risks Reviewed] : Medication risks reviewed [6 Weeks] : in 6 weeks [de-identified] : I discussed the underlying pathophysiology of the patient's condition & MRI findings. I expressed to the patient that his symptoms based on imaging is consistent with disc herniation at C3-C4 as well as multilevel disc osteophyte complexes from C4-C7 on the left. At this time, the patient and I discussed his options regarding treatment. Given the mild nature of his symptoms, I expressed that epidural injection is not necessary at this time. Patient to continue with physical therapy and chiropractor as it's been helping his symptoms. NSAID use as needed discussed. He may continue taking Gabapentin as needed. The patient should follow-up with me in 2 months for further assessment of his symptoms. If symptoms worsen, we may consider epidural injection at that time, although the patient is well aware that this will significantly affect his blood sugar as he is a diabetic.  The patient is currently not a surgical candidate at this time.

## 2024-01-17 NOTE — HISTORY OF PRESENT ILLNESS
[Intermit.] : ~He/She~ states the symptoms seem to be intermittent [4] : a current pain level of 4/10 [de-identified] : Adalberto is a 73-year-old male who presents for a follow-up visit for Cervical DDD and review MRI results. Patient obtained MRI of the cervical spine on 01/11/2023. The patient states that he continues to have neck pain with radiculopathy to his left upper extremity. Patient continues to complain of left-hand numbness although it has been improving. He takes Gabapentin for symptomatic relief. He continues to go to physical therapy twice a week and states it's been helping. He is also going to the chiropractor twice a week with help as well. The patient denies any headaches, dizziness, or lightheadedness. The patient continues to take blood thinners.  He has a history of diabetic neuropathy.  There is no recent history of trauma or injury since his last visit with us.

## 2024-01-17 NOTE — ADDENDUM
[FreeTextEntry1] : I, Femigastontahiraverona Nicolasaphillipprema, acted solely as a scribe for Dr. Lauro Amaro on this date 01/17/2024 .  All medical record entries made by the Scribe were at my, Dr. Lauro Amaro, direction and personally dictated by me on 10/30/2023 . I have reviewed the chart and agree that the record accurately reflects my personal performance of the history, physical exam, assessment and plan. I have also personally directed, reviewed, and agreed with the chart.

## 2024-01-17 NOTE — PHYSICAL EXAM
[Wide-Based] : wide-based [UE/LE] : Motor: 5/5 motor strength in bilateral upper & lower extremities [] : Sensory: [C5-Bilat] : C5 [C6-Bilat] : C6 [C7-Bilat] : C7 [S1-Bilat] : S1 [Normal] : Gait: normal [de-identified] : Range of motion is full but painful to the cervical spine. [de-identified] : EXAM: [id number] - MR SPINE CERVICAL  - ORDERED BY: DEANDRA BLANC PROCEDURE DATE:  01/11/2024 INTERPRETATION:  MR CERVICAL SPINE IMPRESSION: *  Mild-to-moderate multilevel cervical spondylosis, most notable at C3-C4 where there is mild retrolisthesis, mild contact on the cord, moderate spinal canal stenosis and moderate to severe left greater than foraminal narrowing. *  Additional multilevel spinal canal and foraminal narrowing, advanced on the left at C7-T1 with contact on the left C8 exiting nerve. *  No cord signal abnormality.

## 2024-03-19 ENCOUNTER — APPOINTMENT (OUTPATIENT)
Dept: ORTHOPEDIC SURGERY | Facility: CLINIC | Age: 74
End: 2024-03-19

## 2024-05-06 ENCOUNTER — NON-APPOINTMENT (OUTPATIENT)
Age: 74
End: 2024-05-06

## 2024-05-06 ENCOUNTER — APPOINTMENT (OUTPATIENT)
Dept: ELECTROPHYSIOLOGY | Facility: CLINIC | Age: 74
End: 2024-05-06
Payer: MEDICARE

## 2024-05-06 VITALS
SYSTOLIC BLOOD PRESSURE: 123 MMHG | HEIGHT: 66 IN | OXYGEN SATURATION: 96 % | BODY MASS INDEX: 33.43 KG/M2 | HEART RATE: 71 BPM | DIASTOLIC BLOOD PRESSURE: 77 MMHG | WEIGHT: 208 LBS

## 2024-05-06 DIAGNOSIS — I49.3 VENTRICULAR PREMATURE DEPOLARIZATION: ICD-10-CM

## 2024-05-06 DIAGNOSIS — I47.19 OTHER SUPRAVENTRICULAR TACHYCARDIA: ICD-10-CM

## 2024-05-06 PROCEDURE — 99213 OFFICE O/P EST LOW 20 MIN: CPT

## 2024-05-06 PROCEDURE — 93000 ELECTROCARDIOGRAM COMPLETE: CPT

## 2024-05-06 RX ORDER — APIXABAN 5 MG/1
5 TABLET, FILM COATED ORAL
Qty: 180 | Refills: 1 | Status: ACTIVE | COMMUNITY
Start: 2019-12-23 | End: 1900-01-01

## 2024-05-06 RX ORDER — ROSUVASTATIN CALCIUM 10 MG/1
10 TABLET, FILM COATED ORAL
Qty: 90 | Refills: 3 | Status: ACTIVE | COMMUNITY

## 2024-05-06 NOTE — DISCUSSION/SUMMARY
[FreeTextEntry1] : In summary, the patient is a 74-year-old gentleman with a prior history of frequent PVCs status post ablation, and symptomatic paroxysmal atrial fibrillation status post ablation. Since, he has been doing very well. He has been compliant with his CPAP. He will continue Eliquis and Diltiazem at this time and follow-up with us in 6 months' time.  His symptoms of chest pressure have been going on for a while.  He did have a prior stress test, but I think it would be reasonable for this to really repeated at some point.  I will leave that up to Dr. Perry. [EKG obtained to assist in diagnosis and management of assessed problem(s)] : EKG obtained to assist in diagnosis and management of assessed problem(s)

## 2024-05-06 NOTE — HISTORY OF PRESENT ILLNESS
[FreeTextEntry1] : I had the pleasure of seeing your patient Adalberto Scanlno today in arrhythmia clinic of [facility]. As you well know the patient is a delightful and anxious 74-year-old gentleman with a history of paroxysmal atrial fibrillation, frequent PVCs, hypertension, hyperlipidemia and diabetes. The patient was found to have frequent ventricular ectopy found. He underwent a cardiac workup. An echocardiogram on June 18, 2019 demonstrated normal left ventricular size and function except for mild LVH and mild systolic dysfunction with an ejection fraction of 55% and left atrial enlargement at 5.6 cm. A nuclear stress test in February of 2017 showed no evidence of ischemia however there was an ejection fraction of 45% seen. A cardiac MRI performed in July of 2019 demonstrated normal left ventricle size, thickness and an ejection fraction of 55%, no late gadolinium enhancement was noted.  The patient underwent a cardiac monitor. He had 44,000 PVCs which accounted for 35% of his beats. He has no symptoms with his ectopic beats.   He underwent an EPS and ablation on September 25, 2019 of an anterolateral RVOT PVC, which was the predominant PVC during that case. A second PVC was mapped to the left septum and found to be parahisian. A decision was made not to ablate that PVC given the risk of heart block. He was subsequently re-started on flecainide and a event monitor showed almost complete suppression of his PVCs. His flecainide was held, and a subsequent event monitor showed 4776 PVCs (2.1% burden) over 24 hours and short runs of atrial tachycardia. The patient continued off AAs and called with symptoms of fatigue and palpitations. He was found to have runs of symptomatic atrial fibrillation and was placed on Eliquis, Flecainde and Diltiazem.   The patient underwent an atrial fibrillation ablation on 2/10/20. this included a PVI and CTI ablation. He made an unremarkable recovery. His flecainide was discontinued but he remained on Eliquis and Diltiazem. I had the patient undergo a sleep study with Dr. Gorman and he was found to have severe sleep apnea. He is currently on CPAP. He had a cardiac monitor placed in December 2020 that demonstrated predominantly sinus rhythm <1% pacs, pvcs and no atrial fibrillation.  The patient had done well in previous follow-ups and returns to clinic today.  Overall, he continues to do well.  He denies any palpitations, lightheadedness, presyncope or syncope.  He does have a watch that he wears routinely which has not triggered for atrial fibrillation.  He describes 2 episodes of some chest pressure that occurred while he was at home at rest and not had exertion.  In the lasted a couple of hours.  His blood pressure today was 123/77 his pulse 71 and regular.  His EKG demonstrated sinus rhythm at 74 bpm and was normal.

## 2024-05-15 ENCOUNTER — APPOINTMENT (OUTPATIENT)
Dept: PULMONOLOGY | Facility: CLINIC | Age: 74
End: 2024-05-15
Payer: MEDICARE

## 2024-05-15 VITALS
HEART RATE: 71 BPM | RESPIRATION RATE: 15 BRPM | DIASTOLIC BLOOD PRESSURE: 70 MMHG | SYSTOLIC BLOOD PRESSURE: 121 MMHG | HEIGHT: 66 IN | OXYGEN SATURATION: 97 % | WEIGHT: 211.13 LBS | TEMPERATURE: 97.7 F | BODY MASS INDEX: 33.93 KG/M2

## 2024-05-15 DIAGNOSIS — G47.33 OBSTRUCTIVE SLEEP APNEA (ADULT) (PEDIATRIC): ICD-10-CM

## 2024-05-15 PROCEDURE — 99213 OFFICE O/P EST LOW 20 MIN: CPT | Mod: GC
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
## 2024-05-15 NOTE — REVIEW OF SYSTEMS
[Fatigue] : fatigue [EDS: ESS=____] : no daytime somnolence [Difficulty Initiating Sleep] : no difficulty falling asleep [Difficulty Maintaining Sleep] : no difficulty maintaining sleep

## 2024-05-15 NOTE — ASSESSMENT
[FreeTextEntry1] : 73 yo M PMH severe ERIK on CPAP, afib s/p ablation on AC, HTN, DM, and obesity presenting for follow up. He was diagnosed with severe apnea during arrhythmia workup. Initial diagnostic study with GLORIA of 41.1. Most recent compliance data shows 100% usage (avg 7h 3min) with normalization of AHI (residual 0.6/hr) on APAP 4-20 cmH2O. He is recommended to continue with APAP usage as he is deriving benefit with improvement in daytime energy and sleep quality. He is due for machine refresh in May 2025.

## 2024-05-17 ENCOUNTER — RX RENEWAL (OUTPATIENT)
Age: 74
End: 2024-05-17

## 2024-05-17 RX ORDER — DILTIAZEM HYDROCHLORIDE 120 MG/1
120 CAPSULE, EXTENDED RELEASE ORAL
Qty: 90 | Refills: 1 | Status: ACTIVE | COMMUNITY
Start: 2019-12-23 | End: 1900-01-01

## 2024-05-30 ENCOUNTER — OUTPATIENT (OUTPATIENT)
Dept: OUTPATIENT SERVICES | Facility: HOSPITAL | Age: 74
LOS: 1 days | Discharge: HOME | End: 2024-05-30

## 2024-05-30 DIAGNOSIS — Z96.651 PRESENCE OF RIGHT ARTIFICIAL KNEE JOINT: Chronic | ICD-10-CM

## 2024-05-30 DIAGNOSIS — G56.00 CARPAL TUNNEL SYNDROME, UNSPECIFIED UPPER LIMB: Chronic | ICD-10-CM

## 2024-05-30 DIAGNOSIS — Z53.8 PROCEDURE AND TREATMENT NOT CARRIED OUT FOR OTHER REASONS: Chronic | ICD-10-CM

## 2024-05-30 DIAGNOSIS — D47.2 MONOCLONAL GAMMOPATHY: ICD-10-CM

## 2024-06-06 ENCOUNTER — RESULT REVIEW (OUTPATIENT)
Age: 74
End: 2024-06-06

## 2024-06-06 ENCOUNTER — APPOINTMENT (OUTPATIENT)
Dept: HEMATOLOGY ONCOLOGY | Facility: CLINIC | Age: 74
End: 2024-06-06
Payer: MEDICARE

## 2024-06-06 VITALS
TEMPERATURE: 97.4 F | OXYGEN SATURATION: 98 % | DIASTOLIC BLOOD PRESSURE: 73 MMHG | RESPIRATION RATE: 16 BRPM | WEIGHT: 210.98 LBS | SYSTOLIC BLOOD PRESSURE: 122 MMHG | HEART RATE: 73 BPM | BODY MASS INDEX: 34.05 KG/M2

## 2024-06-06 DIAGNOSIS — D47.2 MONOCLONAL GAMMOPATHY: ICD-10-CM

## 2024-06-06 DIAGNOSIS — M17.9 OSTEOARTHRITIS OF KNEE, UNSPECIFIED: ICD-10-CM

## 2024-06-06 DIAGNOSIS — I48.0 PAROXYSMAL ATRIAL FIBRILLATION: ICD-10-CM

## 2024-06-06 LAB
ALBUMIN SERPL ELPH-MCNC: 4.4 G/DL
ALP BLD-CCNC: 88 U/L
ALT SERPL-CCNC: 18 U/L
ANION GAP SERPL CALC-SCNC: 13 MMOL/L
AST SERPL-CCNC: 18 U/L
B2 MICROGLOB SERPL-MCNC: 1.8 MG/L
BASOPHILS # BLD AUTO: 0.06 K/UL — SIGNIFICANT CHANGE UP (ref 0–0.2)
BASOPHILS NFR BLD AUTO: 1.1 % — SIGNIFICANT CHANGE UP (ref 0–2)
BILIRUB SERPL-MCNC: 0.6 MG/DL
BUN SERPL-MCNC: 19 MG/DL
CALCIUM SERPL-MCNC: 9.2 MG/DL
CHLORIDE SERPL-SCNC: 103 MMOL/L
CO2 SERPL-SCNC: 22 MMOL/L
CREAT SERPL-MCNC: 1.03 MG/DL
EGFR: 77 ML/MIN/1.73M2
EOSINOPHIL # BLD AUTO: 0.19 K/UL — SIGNIFICANT CHANGE UP (ref 0–0.5)
EOSINOPHIL NFR BLD AUTO: 3.5 % — SIGNIFICANT CHANGE UP (ref 0–6)
GLUCOSE SERPL-MCNC: 143 MG/DL
HCT VFR BLD CALC: 42.9 % — SIGNIFICANT CHANGE UP (ref 39–50)
HGB BLD-MCNC: 14.4 G/DL — SIGNIFICANT CHANGE UP (ref 13–17)
IMM GRANULOCYTES NFR BLD AUTO: 0.4 % — SIGNIFICANT CHANGE UP (ref 0–0.9)
LDH SERPL-CCNC: 237 U/L
LYMPHOCYTES # BLD AUTO: 1.31 K/UL — SIGNIFICANT CHANGE UP (ref 1–3.3)
LYMPHOCYTES # BLD AUTO: 23.9 % — SIGNIFICANT CHANGE UP (ref 13–44)
MCHC RBC-ENTMCNC: 31.4 PG — SIGNIFICANT CHANGE UP (ref 27–34)
MCHC RBC-ENTMCNC: 33.6 G/DL — SIGNIFICANT CHANGE UP (ref 32–36)
MCV RBC AUTO: 93.7 FL — SIGNIFICANT CHANGE UP (ref 80–100)
MONOCYTES # BLD AUTO: 0.48 K/UL — SIGNIFICANT CHANGE UP (ref 0–0.9)
MONOCYTES NFR BLD AUTO: 8.8 % — SIGNIFICANT CHANGE UP (ref 2–14)
NEUTROPHILS # BLD AUTO: 3.42 K/UL — SIGNIFICANT CHANGE UP (ref 1.8–7.4)
NEUTROPHILS NFR BLD AUTO: 62.3 % — SIGNIFICANT CHANGE UP (ref 43–77)
NRBC # BLD: 0 /100 WBCS — SIGNIFICANT CHANGE UP (ref 0–0)
PLATELET # BLD AUTO: 172 K/UL — SIGNIFICANT CHANGE UP (ref 150–400)
POTASSIUM SERPL-SCNC: 4.3 MMOL/L
PROT SERPL-MCNC: 6.6 G/DL
RBC # BLD: 4.58 M/UL — SIGNIFICANT CHANGE UP (ref 4.2–5.8)
RBC # FLD: 13.1 % — SIGNIFICANT CHANGE UP (ref 10.3–14.5)
SODIUM SERPL-SCNC: 138 MMOL/L
WBC # BLD: 5.48 K/UL — SIGNIFICANT CHANGE UP (ref 3.8–10.5)
WBC # FLD AUTO: 5.48 K/UL — SIGNIFICANT CHANGE UP (ref 3.8–10.5)

## 2024-06-06 PROCEDURE — G2211 COMPLEX E/M VISIT ADD ON: CPT

## 2024-06-06 PROCEDURE — 99213 OFFICE O/P EST LOW 20 MIN: CPT

## 2024-06-06 RX ORDER — GABAPENTIN 300 MG/1
300 CAPSULE ORAL
Qty: 90 | Refills: 2 | Status: DISCONTINUED | COMMUNITY
Start: 2024-01-10 | End: 2024-06-06

## 2024-06-08 PROBLEM — I48.0 AF (PAROXYSMAL ATRIAL FIBRILLATION): Status: ACTIVE | Noted: 2019-12-23

## 2024-06-08 LAB
ALBUMIN MFR SERPL ELPH: 61 %
ALBUMIN SERPL-MCNC: 4 G/DL
ALBUMIN/GLOB SERPL: 1.5 RATIO
ALPHA1 GLOB MFR SERPL ELPH: 3.9 %
ALPHA1 GLOB SERPL ELPH-MCNC: 0.3 G/DL
ALPHA2 GLOB MFR SERPL ELPH: 12.7 %
ALPHA2 GLOB SERPL ELPH-MCNC: 0.8 G/DL
B-GLOBULIN MFR SERPL ELPH: 9.7 %
B-GLOBULIN SERPL ELPH-MCNC: 0.6 G/DL
DEPRECATED KAPPA LC FREE/LAMBDA SER: 9.79 RATIO
GAMMA GLOB FLD ELPH-MCNC: 0.8 G/DL
GAMMA GLOB MFR SERPL ELPH: 12.7 %
IGA SER QL IEP: 74 MG/DL
IGG SER QL IEP: 877 MG/DL
IGM SER QL IEP: 41 MG/DL
INTERPRETATION SERPL IEP-IMP: NORMAL
KAPPA LC CSF-MCNC: 1 MG/DL
KAPPA LC SERPL-MCNC: 9.79 MG/DL
M PROTEIN MFR SERPL ELPH: 6.4 %
M PROTEIN SPEC IFE-MCNC: NORMAL
MONOCLON BAND OBS SERPL: 0.4 G/DL
PROT SERPL-MCNC: 6.6 G/DL
PROT SERPL-MCNC: 6.6 G/DL

## 2024-06-08 NOTE — CONSULT LETTER
[DrYuniel  ___] : Dr. FLYNN [DrYuniel ___] : Dr. FLYNN [FreeTextEntry3] : Germán Allen M.D., [facility]

## 2024-06-08 NOTE — ASSESSMENT
[Palliative] : Goals of care discussed with patient: Palliative [Palliative Care Plan] : not applicable at this time [FreeTextEntry1] : MGUS - stable for several years; he remains at higher risk on account of  abnormal quantitative free serum light chain ratio.   In diagnostic marrow from 9/2012 overall there was < 10% plasma cells by  staining. Cyclin D1 was (+) suggesting presence of t(11;14).  FISH not done in outside marrow.  Dz cont to be stable since he was first seen here over 10 years ago. No c/o suggestive of myeloma or NHL. No new skeletal c/o - has chronic pain related to known DJD Renal fxn, Ca stable CBC results reviewed and d/w pt WBC 5.48, Hgb 14.4, Plt 172K, nl diff Repeat CMP, LDH, quant free serum light chains and ratio, B2MG, immunoEP last few immunoprotein studies stable; has mildly decr IgA ( 6/2024-> 61) stably abnl FLC studies (slight uptrend in 6/2024-> FLC ratio 8.72)   Continue f/u of cardiac arrhythmia with Dr. Sewell cont mgmt of DM (Hb A1c in May 2024-> 6.7) cont PT for ortho c/o  Follow up in 1 yr   Orlandou Thin Temi  PGY- 5  Heme/onc fellow

## 2024-06-08 NOTE — PHYSICAL EXAM
[Fully active, able to carry on all pre-disease performance without restriction] : Status 0 - Fully active, able to carry on all pre-disease performance without restriction [Normal] : affect appropriate [de-identified] : no CVAT
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
## 2024-07-02 NOTE — PATIENT PROFILE ADULT - NSPROMUTANXFEARFT_GEN_A_NUR
Left message to schedule Nuclear stress test.  Electronically signed by Anna Esparza on 7/2/2024 at 1:32 PM     none

## 2024-11-11 ENCOUNTER — APPOINTMENT (OUTPATIENT)
Dept: ELECTROPHYSIOLOGY | Facility: CLINIC | Age: 74
End: 2024-11-11
Payer: MEDICARE

## 2024-11-11 ENCOUNTER — NON-APPOINTMENT (OUTPATIENT)
Age: 74
End: 2024-11-11

## 2024-11-11 VITALS
SYSTOLIC BLOOD PRESSURE: 120 MMHG | WEIGHT: 205 LBS | BODY MASS INDEX: 33.09 KG/M2 | DIASTOLIC BLOOD PRESSURE: 71 MMHG | OXYGEN SATURATION: 96 % | HEART RATE: 79 BPM

## 2024-11-11 DIAGNOSIS — I48.0 PAROXYSMAL ATRIAL FIBRILLATION: ICD-10-CM

## 2024-11-11 DIAGNOSIS — I49.3 VENTRICULAR PREMATURE DEPOLARIZATION: ICD-10-CM

## 2024-11-11 PROCEDURE — 99213 OFFICE O/P EST LOW 20 MIN: CPT

## 2024-11-11 PROCEDURE — 93000 ELECTROCARDIOGRAM COMPLETE: CPT

## 2025-05-12 ENCOUNTER — NON-APPOINTMENT (OUTPATIENT)
Age: 75
End: 2025-05-12

## 2025-05-12 ENCOUNTER — APPOINTMENT (OUTPATIENT)
Dept: ELECTROPHYSIOLOGY | Facility: CLINIC | Age: 75
End: 2025-05-12
Payer: MEDICARE

## 2025-05-12 VITALS
WEIGHT: 195 LBS | BODY MASS INDEX: 31.34 KG/M2 | OXYGEN SATURATION: 96 % | HEIGHT: 66 IN | HEART RATE: 71 BPM | RESPIRATION RATE: 14 BRPM | SYSTOLIC BLOOD PRESSURE: 124 MMHG | DIASTOLIC BLOOD PRESSURE: 75 MMHG

## 2025-05-12 DIAGNOSIS — I49.3 VENTRICULAR PREMATURE DEPOLARIZATION: ICD-10-CM

## 2025-05-12 DIAGNOSIS — I48.0 PAROXYSMAL ATRIAL FIBRILLATION: ICD-10-CM

## 2025-05-12 PROCEDURE — 99214 OFFICE O/P EST MOD 30 MIN: CPT

## 2025-05-12 PROCEDURE — 93000 ELECTROCARDIOGRAM COMPLETE: CPT

## 2025-05-14 ENCOUNTER — APPOINTMENT (OUTPATIENT)
Dept: PULMONOLOGY | Facility: CLINIC | Age: 75
End: 2025-05-14
Payer: MEDICARE

## 2025-05-14 VITALS
HEART RATE: 64 BPM | BODY MASS INDEX: 32.14 KG/M2 | WEIGHT: 200 LBS | DIASTOLIC BLOOD PRESSURE: 78 MMHG | TEMPERATURE: 97.6 F | SYSTOLIC BLOOD PRESSURE: 150 MMHG | HEIGHT: 66 IN | OXYGEN SATURATION: 98 %

## 2025-05-14 DIAGNOSIS — G47.33 OBSTRUCTIVE SLEEP APNEA (ADULT) (PEDIATRIC): ICD-10-CM

## 2025-05-14 PROCEDURE — 99214 OFFICE O/P EST MOD 30 MIN: CPT

## 2025-05-21 ENCOUNTER — APPOINTMENT (OUTPATIENT)
Dept: ORTHOPEDIC SURGERY | Facility: CLINIC | Age: 75
End: 2025-05-21
Payer: MEDICARE

## 2025-05-21 DIAGNOSIS — M48.062 SPINAL STENOSIS, LUMBAR REGION WITH NEUROGENIC CLAUDICATION: ICD-10-CM

## 2025-05-21 DIAGNOSIS — M50.90 CERVICAL DISC DISORDER, UNSPECIFIED, UNSPECIFIED CERVICAL REGION: ICD-10-CM

## 2025-05-21 PROCEDURE — 99214 OFFICE O/P EST MOD 30 MIN: CPT

## 2025-05-28 DIAGNOSIS — R26.9 UNSPECIFIED ABNORMALITIES OF GAIT AND MOBILITY: ICD-10-CM

## 2025-07-21 ENCOUNTER — APPOINTMENT (OUTPATIENT)
Dept: ORTHOPEDIC SURGERY | Facility: CLINIC | Age: 75
End: 2025-07-21
Payer: MEDICARE

## 2025-07-21 DIAGNOSIS — M48.062 SPINAL STENOSIS, LUMBAR REGION WITH NEUROGENIC CLAUDICATION: ICD-10-CM

## 2025-07-21 PROCEDURE — 99214 OFFICE O/P EST MOD 30 MIN: CPT
